# Patient Record
Sex: FEMALE | Race: WHITE | NOT HISPANIC OR LATINO | Employment: FULL TIME | RURAL
[De-identification: names, ages, dates, MRNs, and addresses within clinical notes are randomized per-mention and may not be internally consistent; named-entity substitution may affect disease eponyms.]

---

## 2021-11-23 ENCOUNTER — LAB VISIT (OUTPATIENT)
Dept: PRIMARY CARE CLINIC | Facility: CLINIC | Age: 26
End: 2021-11-23

## 2021-11-23 DIAGNOSIS — Z02.83 ENCOUNTER FOR DRUG SCREENING: Primary | ICD-10-CM

## 2021-11-23 PROCEDURE — 99000 SPECIMEN HANDLING OFFICE-LAB: CPT | Mod: ,,, | Performed by: NURSE PRACTITIONER

## 2021-11-23 PROCEDURE — 99000 PR SPECIMEN HANDLING,DR OFF->LAB: ICD-10-PCS | Mod: ,,, | Performed by: NURSE PRACTITIONER

## 2024-11-11 ENCOUNTER — OFFICE VISIT (OUTPATIENT)
Dept: FAMILY MEDICINE | Facility: CLINIC | Age: 29
End: 2024-11-11
Payer: COMMERCIAL

## 2024-11-11 VITALS
RESPIRATION RATE: 16 BRPM | OXYGEN SATURATION: 98 % | WEIGHT: 134 LBS | HEIGHT: 62 IN | DIASTOLIC BLOOD PRESSURE: 85 MMHG | TEMPERATURE: 98 F | BODY MASS INDEX: 24.66 KG/M2 | HEART RATE: 106 BPM | SYSTOLIC BLOOD PRESSURE: 118 MMHG

## 2024-11-11 DIAGNOSIS — M25.562 PAIN AND SWELLING OF KNEE, LEFT: Primary | ICD-10-CM

## 2024-11-11 DIAGNOSIS — S89.92XA INJURY OF LEFT KNEE, INITIAL ENCOUNTER: ICD-10-CM

## 2024-11-11 DIAGNOSIS — M25.562 ACUTE PAIN OF LEFT KNEE: ICD-10-CM

## 2024-11-11 DIAGNOSIS — M25.462 PAIN AND SWELLING OF KNEE, LEFT: Primary | ICD-10-CM

## 2024-11-11 LAB
BASOPHILS # BLD AUTO: 0.08 K/UL (ref 0–0.2)
BASOPHILS NFR BLD AUTO: 1.2 % (ref 0–1)
CRP SERPL-MCNC: <0.29 MG/DL (ref 0–0.8)
DIFFERENTIAL METHOD BLD: ABNORMAL
EOSINOPHIL # BLD AUTO: 0.44 K/UL (ref 0–0.5)
EOSINOPHIL NFR BLD AUTO: 6.8 % (ref 1–4)
ERYTHROCYTE [DISTWIDTH] IN BLOOD BY AUTOMATED COUNT: 13.5 % (ref 11.5–14.5)
ERYTHROCYTE [SEDIMENTATION RATE] IN BLOOD BY WESTERGREN METHOD: 2 MM/HR (ref 0–20)
FOLATE SERPL-MCNC: 9.6 NG/ML (ref 3.1–17.5)
HCT VFR BLD AUTO: 44.7 % (ref 38–47)
HGB BLD-MCNC: 14.7 G/DL (ref 12–16)
IMM GRANULOCYTES # BLD AUTO: 0.01 K/UL (ref 0–0.04)
IMM GRANULOCYTES NFR BLD: 0.2 % (ref 0–0.4)
LYMPHOCYTES # BLD AUTO: 0.9 K/UL (ref 1–4.8)
LYMPHOCYTES NFR BLD AUTO: 14 % (ref 27–41)
MCH RBC QN AUTO: 27.8 PG (ref 27–31)
MCHC RBC AUTO-ENTMCNC: 32.9 G/DL (ref 32–36)
MCV RBC AUTO: 84.5 FL (ref 80–96)
MONOCYTES # BLD AUTO: 0.44 K/UL (ref 0–0.8)
MONOCYTES NFR BLD AUTO: 6.8 % (ref 2–6)
MPC BLD CALC-MCNC: 11.4 FL (ref 9.4–12.4)
NEUTROPHILS # BLD AUTO: 4.56 K/UL (ref 1.8–7.7)
NEUTROPHILS NFR BLD AUTO: 71 % (ref 53–65)
NRBC # BLD AUTO: 0 X10E3/UL
NRBC, AUTO (.00): 0 %
PLATELET # BLD AUTO: 230 K/UL (ref 150–400)
RBC # BLD AUTO: 5.29 M/UL (ref 4.2–5.4)
TSH SERPL DL<=0.005 MIU/L-ACNC: 3.06 UIU/ML (ref 0.36–3.74)
VIT B12 SERPL-MCNC: 425 PG/ML (ref 193–986)
WBC # BLD AUTO: 6.43 K/UL (ref 4.5–11)

## 2024-11-11 PROCEDURE — 86140 C-REACTIVE PROTEIN: CPT | Mod: ,,, | Performed by: CLINICAL MEDICAL LABORATORY

## 2024-11-11 PROCEDURE — 1160F RVW MEDS BY RX/DR IN RCRD: CPT | Mod: CPTII,,, | Performed by: FAMILY MEDICINE

## 2024-11-11 PROCEDURE — 1159F MED LIST DOCD IN RCRD: CPT | Mod: CPTII,,, | Performed by: FAMILY MEDICINE

## 2024-11-11 PROCEDURE — 82746 ASSAY OF FOLIC ACID SERUM: CPT | Mod: ,,, | Performed by: CLINICAL MEDICAL LABORATORY

## 2024-11-11 PROCEDURE — 3008F BODY MASS INDEX DOCD: CPT | Mod: CPTII,,, | Performed by: FAMILY MEDICINE

## 2024-11-11 PROCEDURE — 3074F SYST BP LT 130 MM HG: CPT | Mod: CPTII,,, | Performed by: FAMILY MEDICINE

## 2024-11-11 PROCEDURE — 99203 OFFICE O/P NEW LOW 30 MIN: CPT | Mod: GE,,, | Performed by: FAMILY MEDICINE

## 2024-11-11 PROCEDURE — 85651 RBC SED RATE NONAUTOMATED: CPT | Mod: ,,, | Performed by: CLINICAL MEDICAL LABORATORY

## 2024-11-11 PROCEDURE — 3079F DIAST BP 80-89 MM HG: CPT | Mod: CPTII,,, | Performed by: FAMILY MEDICINE

## 2024-11-11 PROCEDURE — 85025 COMPLETE CBC W/AUTO DIFF WBC: CPT | Mod: ,,, | Performed by: CLINICAL MEDICAL LABORATORY

## 2024-11-11 PROCEDURE — 82607 VITAMIN B-12: CPT | Mod: ,,, | Performed by: CLINICAL MEDICAL LABORATORY

## 2024-11-11 PROCEDURE — 84443 ASSAY THYROID STIM HORMONE: CPT | Mod: ,,, | Performed by: CLINICAL MEDICAL LABORATORY

## 2024-11-11 RX ORDER — MELOXICAM 7.5 MG/1
7.5 TABLET ORAL DAILY
Qty: 30 TABLET | Refills: 0 | Status: SHIPPED | OUTPATIENT
Start: 2024-11-11 | End: 2024-12-11

## 2024-11-11 NOTE — PROGRESS NOTES
"  Fabiola Burch MD MPH  [address], [place] [address]  Phone: [phone]     Subjective     Name: James Kirk   YOB: 1995 (29 y.o.)  MRN: [id number]  Visit Date: 11/11/2024   Chief Complaint: Establish Care and Pain (Knee, left , h/o recurrent "knee dislocation, treated with PT, last episode was 2019, no local ortho, this episode 10 days ago sitting in bathroom and knee popped)        HISTORY OF PRESENT ILLNESS:  Patient is a 28 yo female with no significant PMH. She came to the clinic for the complaint of left sided knee pain and lower extremity weakness/numbness/tingling. She reported that on Halloween james, she was getting up from the commode when she heard a "popping sound" from her left knee. She experienced a sharp pain in her knee that traveled to her calf and foot. She was not able to get out of commode in anticipation of pain fear of falling.  She has been using crutches since the episode.  Patient denied fever, chills, loss of bladder/rectal control.  Patient has had at least 3 episodes of knee joint dislocation from 2015 till 2024,  LMP was 4 months ago and she is on Inject ible contraception.         PAST MEDICAL HISTORY:  Significant Diagnoses - Patient  has no past medical history on file.  Medications - Patient is not on any long-term medications.   Allergies - Patient has No Known Allergies.  Surgeries - Patient  has no past surgical history on file.  Family History - Patient family history is not on file.      SOCIAL HISTORY:  Tobacco - Patient  reports that she has never smoked. She has never used smokeless tobacco.   Alcohol - Patient  reports that she does not currently use alcohol.   Recreational Drugs - Patient  reports no history of drug use.       Review of Systems   Constitutional:  Negative for activity change, appetite change, chills, fatigue and fever.   HENT:  Negative for nasal congestion, ear discharge, ear pain, hearing loss, postnasal drip, rhinorrhea, " "sinus pressure/congestion and sore throat.    Eyes:  Negative for discharge, itching and visual disturbance.   Respiratory:  Negative for cough, choking, chest tightness, shortness of breath and wheezing.    Cardiovascular:  Negative for chest pain, palpitations, leg swelling and claudication.   Gastrointestinal:  Negative for abdominal distention, abdominal pain, constipation, diarrhea, nausea, vomiting and reflux.   Genitourinary:  Negative for difficulty urinating, dysuria, frequency, hematuria and urgency.   Musculoskeletal:  Positive for joint swelling and leg pain. Negative for arthralgias and myalgias.   Integumentary:  Negative for rash.   Neurological:  Positive for weakness. Negative for tremors, light-headedness, numbness and headaches.          History reviewed. No pertinent past medical history.     Review of patient's allergies indicates:  No Known Allergies     History reviewed. No pertinent surgical history.     History reviewed. No pertinent family history.    Current Outpatient Medications   Medication Instructions    meloxicam (MOBIC) 7.5 mg, Oral, Daily        Objective     /85 (BP Location: Left arm, Patient Position: Sitting)   Pulse 106   Temp 98.1 °F (36.7 °C) (Oral)   Resp 16   Ht 5' 2" (1.575 m)   Wt 60.8 kg (134 lb)   LMP  (Exact Date)   SpO2 98%   BMI 24.51 kg/m²     Physical Exam  Vitals and nursing note reviewed.   Constitutional:       Appearance: Normal appearance. She is normal weight.   HENT:      Head: Normocephalic and atraumatic.      Right Ear: Tympanic membrane and ear canal normal.      Left Ear: Tympanic membrane and ear canal normal.      Nose: Nose normal.      Mouth/Throat:      Mouth: Mucous membranes are moist.      Pharynx: Oropharynx is clear.   Eyes:      Extraocular Movements: Extraocular movements intact.      Conjunctiva/sclera: Conjunctivae normal.      Pupils: Pupils are equal, round, and reactive to light.   Cardiovascular:      Rate and Rhythm: " "Normal rate and regular rhythm.      Pulses: Normal pulses.      Heart sounds: Normal heart sounds. No murmur heard.  Pulmonary:      Effort: Pulmonary effort is normal. No respiratory distress.      Breath sounds: Normal breath sounds. No wheezing.   Abdominal:      General: Bowel sounds are normal. There is no distension.      Palpations: Abdomen is soft.      Tenderness: There is no abdominal tenderness. There is no guarding.   Musculoskeletal:         General: Swelling and tenderness present.      Cervical back: Normal range of motion and neck supple.      Right knee: Normal.      Left knee: Swelling present. Decreased range of motion. Tenderness present. No LCL laxity, MCL laxity, ACL laxity or PCL laxity.Normal meniscus and normal patellar mobility.   Skin:     Capillary Refill: Capillary refill takes less than 2 seconds.   Neurological:      General: No focal deficit present.      Mental Status: She is alert and oriented to person, place, and time. Mental status is at baseline.      Deep Tendon Reflexes: Reflexes are normal and symmetric. Reflexes normal.   Psychiatric:         Mood and Affect: Mood normal.         Behavior: Behavior normal.         Thought Content: Thought content normal.         Judgment: Judgment normal.          All recently obtained labs have been reviewed and discussed in detail with the patient.   Assessment     1. Pain and swelling of knee, left    2. Acute pain of left knee    3. Injury of left knee, initial encounter         Plan     Problem List Items Addressed This Visit       Pain and swelling of knee, left - Primary     She came to the clinic for the complaint of left sided knee pain and lower extremity weakness/numbness/tingling. She reported that on Halloween james, she was getting up from the commode when she heard a "popping sound" from her left knee. She experienced a sharp pain in her knee that traveled to her calf and foot. She was not able to get out of commode in " "anticipation of pain fear of falling.  She has been using crutches since the episode.  - Xray 3 view of left knee  - Mobic 7.5 mg  - Ambulatory referral to orthopedic surgery  - Ambulatory referral to physical therapy  - follow up in 10 days         Relevant Medications    meloxicam (MOBIC) 7.5 MG tablet    Other Relevant Orders    CBC Auto Differential    Vitamin B12 & Folate    TSH    C-Reactive Protein    Sedimentation Rate    Ambulatory referral/consult to Orthopedics    Ambulatory referral/consult to Physical/Occupational Therapy    X-Ray Knee 1 or 2 View Left (Completed)    Injury of left knee     She came to the clinic for the complaint of left sided knee pain and lower extremity weakness/numbness/tingling. She reported that on Halloween james, she was getting up from the commode when she heard a "popping sound" from her left knee. She experienced a sharp pain in her knee that traveled to her calf and foot. She was not able to get out of commode in anticipation of pain fear of falling.  She has been using crutches since the episode.  - Xray 3 view of left knee  - Mobic 7.5 mg  - Ambulatory referral to orthopedic surgery  - Ambulatory referral to physical therapy  - follow up in 10 days         Relevant Medications    meloxicam (MOBIC) 7.5 MG tablet    Other Relevant Orders    CBC Auto Differential    Vitamin B12 & Folate    TSH    C-Reactive Protein    Sedimentation Rate    Ambulatory referral/consult to Orthopedics    Ambulatory referral/consult to Physical/Occupational Therapy    X-Ray Knee 1 or 2 View Left (Completed)         All orders:  Orders Placed This Encounter    X-Ray Knee 1 or 2 View Left    CBC Auto Differential    Vitamin B12 & Folate    TSH    C-Reactive Protein    Sedimentation Rate    CBC with Differential    Ambulatory referral/consult to Orthopedics    Ambulatory referral/consult to Physical/Occupational Therapy    meloxicam (MOBIC) 7.5 MG tablet          Follow up in about 1 week (around " 11/18/2024).    Fabiola Burch MD MPH

## 2024-11-11 NOTE — ASSESSMENT & PLAN NOTE
"She came to the clinic for the complaint of left sided knee pain and lower extremity weakness/numbness/tingling. She reported that on Halloween james, she was getting up from the commode when she heard a "popping sound" from her left knee. She experienced a sharp pain in her knee that traveled to her calf and foot. She was not able to get out of commode in anticipation of pain fear of falling.  She has been using crutches since the episode.  - Xray 3 view of left knee  - Mobic 7.5 mg  - Ambulatory referral to orthopedic surgery  - Ambulatory referral to physical therapy  - follow up in 10 days  "

## 2024-11-14 ENCOUNTER — OFFICE VISIT (OUTPATIENT)
Dept: ORTHOPEDICS | Facility: CLINIC | Age: 29
End: 2024-11-14
Payer: COMMERCIAL

## 2024-11-14 VITALS — WEIGHT: 134 LBS | HEIGHT: 62 IN | BODY MASS INDEX: 24.66 KG/M2

## 2024-11-14 DIAGNOSIS — M25.562 ACUTE PAIN OF LEFT KNEE: ICD-10-CM

## 2024-11-14 DIAGNOSIS — M25.462 PAIN AND SWELLING OF KNEE, LEFT: ICD-10-CM

## 2024-11-14 DIAGNOSIS — M25.562 PAIN AND SWELLING OF KNEE, LEFT: ICD-10-CM

## 2024-11-14 DIAGNOSIS — S89.92XA INJURY OF LEFT KNEE, INITIAL ENCOUNTER: ICD-10-CM

## 2024-11-14 PROCEDURE — 99203 OFFICE O/P NEW LOW 30 MIN: CPT | Mod: S$PBB,,,

## 2024-11-14 PROCEDURE — 99213 OFFICE O/P EST LOW 20 MIN: CPT | Mod: PBBFAC

## 2024-11-14 PROCEDURE — 3008F BODY MASS INDEX DOCD: CPT | Mod: CPTII,,,

## 2024-11-14 PROCEDURE — 99999 PR PBB SHADOW E&M-EST. PATIENT-LVL III: CPT | Mod: PBBFAC,,,

## 2024-11-19 ENCOUNTER — CLINICAL SUPPORT (OUTPATIENT)
Dept: REHABILITATION | Facility: HOSPITAL | Age: 29
End: 2024-11-19
Payer: COMMERCIAL

## 2024-11-19 DIAGNOSIS — M25.462 PAIN AND SWELLING OF KNEE, LEFT: ICD-10-CM

## 2024-11-19 DIAGNOSIS — R26.9 GAIT DIFFICULTY: ICD-10-CM

## 2024-11-19 DIAGNOSIS — S89.92XA INJURY OF LEFT KNEE, INITIAL ENCOUNTER: ICD-10-CM

## 2024-11-19 DIAGNOSIS — M62.81 WEAKNESS OF LEFT QUADRICEPS MUSCLE: ICD-10-CM

## 2024-11-19 DIAGNOSIS — M25.662 DECREASED ROM OF LEFT KNEE: ICD-10-CM

## 2024-11-19 DIAGNOSIS — M25.562 PAIN AND SWELLING OF KNEE, LEFT: ICD-10-CM

## 2024-11-19 DIAGNOSIS — M25.562 ACUTE PAIN OF LEFT KNEE: Primary | ICD-10-CM

## 2024-11-19 PROCEDURE — 97162 PT EVAL MOD COMPLEX 30 MIN: CPT

## 2024-11-19 PROCEDURE — 97110 THERAPEUTIC EXERCISES: CPT

## 2024-11-19 PROCEDURE — 97112 NEUROMUSCULAR REEDUCATION: CPT

## 2024-11-19 PROCEDURE — 97116 GAIT TRAINING THERAPY: CPT

## 2024-11-19 NOTE — PLAN OF CARE
[facility]   Physical Therapy Initial Evaluation      Name: James Kirk  Clinic Number: [id number]    Therapy Diagnosis:   Encounter Diagnoses   Name Primary?    Acute pain of left knee     Pain and swelling of knee, left     Injury of left knee, initial encounter         Physician: Shalini Hunt MD    Physician Orders: PT Eval and Treat   Medical Diagnosis from Referral: see above  Evaluation Date: 11/19/2024  Authorization Period Expiration: 11/19/2024  Plan of Care Expiration: 2/14/2025    Date of Surgery: n/a  Visit # / Visits authorized: 1/1 - evaluation only    FOTO: 1/ 3    Precautions: Standard     Time In: 1:06 pm  Time Out: 2:38 pm  Total Billable Time: 92 minutes    Subjective     Date of onset: 10/31/2024    History of current condition - James reports: knee popped when she went to sit down on the commode - said it was a loud crunching sound - said she was to scared to stand back up on her own - has been using crutches or a wheel chair ever since - says she is scared her knee cap is going to dislocate because it has done that a couple of times in the past    Falls: n/a    Imaging: xray - normal; MRI scheduled for 12/9/24    Prior Therapy: none  Social History:  lives with their family  Occupation:   Prior Level of Function: independent   Current Level of Function: has been sleeping in recliner, independent with dressing and toileting    Pain:  Current 0/10, worst 8/10, best 0/10   Location: left knee    Description: Aching, Burning, Sharp, Shooting, and Variable  Aggravating Factors: Standing, Bending, Walking, Extension, and Getting out of bed/chair  Easing Factors: rest and sitting in recliner    Patients goals: be able to walk without crutches and without pain in knee     Medical History:   No past medical history on file.    Surgical History:   James Kirk  has no past surgical history on file.    Medications:   James has a current medication  list which includes the following prescription(s): meloxicam.    Allergies:   Review of patient's allergies indicates:  No Known Allergies     Objective        Range of motion:  Motion Right Left    Knee extension  WITHIN FUNCTIONAL LIMITS   -38 degrees    Knee flexion  WITHIN FUNCTIONAL LIMITS   52 degrees        Manual muscle test   Muscle Right  Left    Knee extension  MMT strength: 4/5  MMT strength: 3/5   Knee flexion  MMT strength: 4/5  MMT strength: 3/5       Gait:  Weight bearing precautions: no weight bearing precautions from MD but patient has not been putting any weight on it since the end of October  Assistive device: in a wheelchair today - uses crutches some  Ambulation distance and deviations:  Stairs: steps to enter home - has to have assistance of 2 people to assist up and down  Comments:      Clinical Special Tests:    Knee  Anterior drawer:  left Negative  Posterior drawer:  left Negative  Varus stress test:  left Negative  Valgus stress test:  left Negative  PFJ grind test:  left Negative  McMurrays:  left Negative      Comments: poor quad set - patient very guarded and was co-shahla severely preventing knee from moving - has not been putting weight on left lower extremity - arrived in wheelchair - pain complaints were in calf/hamstring/quad/hip flexors not the actual knee joint     Intake Outcome Measure for FOTO Knee Survey    Therapist reviewed FOTO scores for James Kirk on 11/19/2024.   FOTO report - see Media section or FOTO account episode details.    Intake Score: 31       Clinical Information for Insurance Authorization     Dates of Services: 11/19/2024 to 02/14/2025  Discharge Plan: Patient will be discharged from skilled physical therapy treatment once all goals have been met, patient has plateaued, or physician/insurance requests discontinuation of care. Patient will be discharged with a home exercise program.   Type of therapy: Rehabilitative  ICD-10 Diagnosis Code(s):    M25.562 (ICD-10-CM) - Acute pain of left knee   M25.562,M25.462 (ICD-10-CM) - Pain and swelling of knee, left   S89.92XA (ICD-10-CM) - Injury of left knee, initial encounter     Which side is symptomatic? Left  Surgical: No  Surgical procedure: N/A  Surgery date: N/A.  Presenting symptoms/diagnoses are repetitive in nature.  Presenting symptoms are non-radiating in nature.   The rehabilitation is not related to a diagnosis of cancer.  The rehabilitation is not related to a diagnosis of lymphedema.  Patient's clinical presentation is:  Severe objective and functional deficits: consistent intense symptoms with severe loss of range of motion, strength, or ability to perform daily tasks  CPT Codes Requested:  68733 [therapeutic exercise], 42733 [neuromuscular re-education], 17504 [gait training], 67855 [manual therapy], 71527 [therapeutic activities], 25976 [aquatic therapy], 60345 [unattended electrical stimulation], 96516 [biofeedback by any modality], and 25309 [vasopneumatic device]     Treatment     Total Treatment time (time-based codes) separate from Evaluation: 42 minutes     James received the treatments listed below:      Passive range of motion to help patient gain full extension in supine, verbal and tactile cueing for quad sets - able to initiate but unable to sustain, weight shifting in walker, gait training with rolling walker - finally started putting about 50% weight through the left lower extremity, gait training up and down stairs w/ bilateral handrails - cues to go up with right lower extremity first and go down with left lower extremity first, kinesiotaping to left lower extremity for neuromuscular reeducation of left quad, education on home exercise program     Gait x 15 min  Neuromuscular reeducation x 15 min  Therex x 12 min    Patient Education and Home Exercises     Education provided:   - evaluation results, plan of care and home exercise program     Written Home Exercises Provided: yes.  "Exercises were reviewed and James was able to demonstrate them prior to the end of the session.  James demonstrated good  understanding of the education provided. See EMR under Patient Instructions for exercises provided during therapy sessions.    Assessment     James is a 29 y.o. female referred to outpatient Physical Therapy with a medical diagnosis of left knee pain. Patient arrived in a wheelchair with complaints of left knee pain that started on Halloween. She says the knee had been feeling "off" that day and she laid down for a little while. Then she says she got up to go to the bathroom and as she went to sit down on the commode her knee "popped and crunched" and scared her as she says her kneecap has dislocated a few times. She states she was too scared to attempt to stand up by herself and began using crutches until she went to a primary care doctor on 11/11/24. She had an xray that was normal. She was referred to ortho and went on 11/14/24. An MRI has been ordered but is not scheduled until 12/9. She states that she was told at both places to not put any weight on the leg until she had her MRI but this is not found in either of their notes. She was extremely guarded and apprehensive today. At the beginning of the evaluation she would not really move the knee. She was co-shahla severely and only had about 14 degrees of range of motion to start. Spent a lot of time trying to get patient to relax and convince her that her knee cap was not going to pop out of place if she moved her knee. After gentle passive range of motion was finally able to get knee to full extension. All of her pain complaints with special tests and range of motion were in the thigh musculature and not the knee joint itself. She had poor quad set. Kinesiotape was placed on left knee with half figure 8 on each side of patella to give quad feedback. By the end of the evaluation she was able to walk with rolling walker with CGA/SBA " and was able to go up/down steps with step to pattern and bilateral handrails.     Patient prognosis is Good.   Patient will benefit from skilled outpatient Physical Therapy to address the deficits stated above and in the chart below, provide patient /family education, and to maximize patientt's level of independence.     Plan of care discussed with patient: Yes  Patient's spiritual, cultural and educational needs considered and patient is agreeable to the plan of care and goals as stated below:     Anticipated Barriers for therapy: severe apprehension about knee cap dislocating    Medical Necessity is demonstrated by the following  History  Co-morbidities and personal factors that may impact the plan of care [] LOW: no personal factors / co-morbidities  [x] MODERATE: 1-2 personal factors / co-morbidities  [] HIGH: 3+ personal factors / co-morbidities    Moderate / High Support Documentation:   Co-morbidities affecting plan of care: h/o patellar dislocation    Personal Factors:   no deficits     Examination  Body Structures and Functions, activity limitations and participation restrictions that may impact the plan of care [] LOW: addressing 1-2 elements  [x] MODERATE: 3+ elements  [] HIGH: 4+ elements (please support below)    Moderate / High Support Documentation: pain, decreased range of motion, gait difficulty, severe apprehension causing self limitation, decreased ability to perform activiites of daily living      Clinical Presentation [] LOW: stable  [x] MODERATE: Evolving  [] HIGH: Unstable     Decision Making/ Complexity Score: moderate         SHORT TERM GOALS  1.  Patient to be independent with home exercise program to facilitate carryover between therapy visits.  2.  Patient will have 0-120 degrees range of motion left knee for improved mobility.  3.  Patient will perform straight leg raise and hold 10 seconds without quad lag for increased quad control.  4.  Patient will increase manual muscle test of  left lower extremity to 4+ to 5/5 for increased stability with gait and activities of daily living.    LONG TERM GOALS  1.  Patient will go up/down stairs reciprocal pattern with handrails as needed and good eccentric control on left lower extremity.  2.  Patient will ambulate independent without assistive device, without deviation and without pain in left knee.  3.  Patient will return to all normal daily activities.     Plan     Plan of care Certification: 11/19/2024 to 2/14/2025.    Outpatient Physical Therapy 2 times weekly for 12 weeks to include the following interventions: 67549 [therapeutic exercise], 27931 [neuromuscular re-education], 32026 [gait training], 03666 [manual therapy], 65359 [therapeutic activities], 66025 [aquatic therapy], 87842 [unattended electrical stimulation], 44756 [biofeedback by any modality], and 47733 [vasopneumatic device].     TED PIPER, PT        Physician's Signature: _________________________________________ Date: ________________

## 2024-11-20 NOTE — PROGRESS NOTES
CC:   Chief Complaint   Patient presents with    Knee Pain     Patient is being seen for left knee pain. Patient stated that she popped her knee on halloween and it has been in pain since. Patient reports having muscle spasms.Denies having any injuries or falls.      HPI     Knee Pain     Additional comments: Patient is being seen for left knee pain. Patient   stated that she popped her knee on halloween and it has been in pain   since. Patient reports having muscle spasms.Denies having any injuries or   falls.          Last edited by Porsha Ruiz CMA on 11/14/2024  3:04 PM.        James Kirk is a 29 y.o. female seen today for Knee Pain (Patient is being seen for left knee pain. Patient stated that she popped her knee on halloween and it has been in pain since. Patient reports having muscle spasms.Denies having any injuries or falls.)  Patient presents today with complaints of chronic left knee pain however the night of Halloween she reports she was was walking and felt a pop in her knee.  It buckled out from under her.  She reports having muscle spasms since that injury.  She has difficulty weight-bearing due to pain.  She also reports some swelling in her knee.  She was [facility] on [facility]/11/2024 where x-rays showed no acute fracture or dislocation, she was referred to Orthopedics.  Already taking Mobic without much relief.  No other complaints today.        PAST MEDICAL HISTORY: No past medical history on file.       PAST SURGICAL HISTORY: No past surgical history on file.       ALLERGIES: Review of patient's allergies indicates:  No Known Allergies     MEDICATIONS :    Current Outpatient Medications:     meloxicam (MOBIC) 7.5 MG tablet, Take 1 tablet (7.5 mg total) by mouth once daily., Disp: 30 tablet, Rfl: 0     SOCIAL HISTORY:   Social History     Socioeconomic History    Marital status: Single   Tobacco Use    Smoking status: Never    Smokeless tobacco: Never    Substance and Sexual Activity    Alcohol use: Not Currently    Drug use: Never    Sexual activity: Yes        FAMILY HISTORY: No family history on file.       PHYSICAL EXAM:      There were no vitals filed for this visit.  Body mass index is 24.51 kg/m².    GENERAL: Well-developed, well-nourished female . The patient is alert, oriented and cooperative.    HEENT:  Normocephalic, atraumatic.  Extraocular movements are intact bilaterally.     NECK:  Nontender with good range of motion.    LUNGS:  Clear to auscultation bilaterally.    HEART:  Regular rate and rhythm.     ABDOMEN:  Soft, non-tender, non-distended.      EXTREMITIES:  Left knee was skin clean dry and intact, mild soft tissue swelling, tenderness to palpation along medial joint line, range of motion from 0-100 degrees but not without pain, knee feels stable to varus and valgus stress testing, positive Breonna's testing, neurovascularly intact      RADIOGRAPHIC FINDINGS:   X-Ray Knee 1 or 2 View Left    Result Date: 11/11/2024  EXAMINATION: XR KNEE 1 OR 2 VIEW LEFT CLINICAL HISTORY: Pain in left knee TECHNIQUE: AP and lateral views of the left knee. COMPARISON: None FINDINGS: No acute fracture or dislocation.  No significant soft tissue swelling. The joint spaces are preserved. No significant suprapatellar effusion.     No acute radiographic findings of the left knee. Electronically signed by: Trev Awan Date:    11/11/2024 Time:    12:34       Patient Active Problem List    Diagnosis Date Noted    Pain and swelling of knee, left 11/11/2024    Injury of left knee 11/11/2024     IMPRESSION AND PLAN:  Acute left knee pain.  Personally reviewed previous x-rays showing mild degenerative changes, no acute fracture or dislocation.  Discussed all treatment options with the patient today.  Given acute injury we will order MRI of the left knee.  We will call her with the MRI results.  Discussed continuation of ice and elevation therapy.  Continue Tylenol as  needed for pain in addition to Mobic.    No follow-ups on file.       Starr Becerra PA-C      (Subject to voice recognition error, transcription service not allowed)

## 2024-11-21 ENCOUNTER — OFFICE VISIT (OUTPATIENT)
Dept: FAMILY MEDICINE | Facility: CLINIC | Age: 29
End: 2024-11-21
Payer: COMMERCIAL

## 2024-11-21 VITALS
DIASTOLIC BLOOD PRESSURE: 77 MMHG | RESPIRATION RATE: 18 BRPM | HEART RATE: 79 BPM | WEIGHT: 134 LBS | HEIGHT: 62 IN | OXYGEN SATURATION: 98 % | SYSTOLIC BLOOD PRESSURE: 114 MMHG | BODY MASS INDEX: 24.66 KG/M2 | TEMPERATURE: 98 F

## 2024-11-21 DIAGNOSIS — M25.562 PAIN AND SWELLING OF KNEE, LEFT: Primary | ICD-10-CM

## 2024-11-21 DIAGNOSIS — M25.462 PAIN AND SWELLING OF KNEE, LEFT: Primary | ICD-10-CM

## 2024-11-21 PROCEDURE — 3078F DIAST BP <80 MM HG: CPT | Mod: CPTII,,, | Performed by: FAMILY MEDICINE

## 2024-11-21 PROCEDURE — 1160F RVW MEDS BY RX/DR IN RCRD: CPT | Mod: CPTII,,, | Performed by: FAMILY MEDICINE

## 2024-11-21 PROCEDURE — 1159F MED LIST DOCD IN RCRD: CPT | Mod: CPTII,,, | Performed by: FAMILY MEDICINE

## 2024-11-21 PROCEDURE — 3008F BODY MASS INDEX DOCD: CPT | Mod: CPTII,,, | Performed by: FAMILY MEDICINE

## 2024-11-21 PROCEDURE — 99212 OFFICE O/P EST SF 10 MIN: CPT | Mod: GE,,, | Performed by: FAMILY MEDICINE

## 2024-11-21 PROCEDURE — 3074F SYST BP LT 130 MM HG: CPT | Mod: CPTII,,, | Performed by: FAMILY MEDICINE

## 2024-11-21 RX ORDER — CYCLOBENZAPRINE HCL 10 MG
10 TABLET ORAL NIGHTLY PRN
Qty: 21 TABLET | Refills: 0 | Status: SHIPPED | OUTPATIENT
Start: 2024-11-21 | End: 2024-11-21

## 2024-11-21 NOTE — PROGRESS NOTES
Subjective:       Patient ID: James Kirk is a 29 y.o. female.    Chief Complaint: Pain (X1 week follow up for pain and swelling in her knee, started PT Tuesday. )    28 yo F with a PMH of left knee injury and chronic left knee pain [facility] pain followup. Patient saw Dr. Burch on 11/11/24 for her left knee pain. Patient had an injury with left knee popping around Halloween time and has been having pain and muscle spasms since. She said the left knee went buckle out from under her when she was sitting on the toilet and trying to stand up. Denies f/c/cp/sob/n/v/d. Denies appetite, urinary, or bowel habit changes. XR left knee on 11/11/24 showed no acute findings without fracture or dislocation. Patient was referred to Ortho, PT (started and said it helped), and was treated with Mobic with some relief. However, patient is still having some left knee pain and needs to use crutches to help. On the day of clinic visit patient was sitting in a wheelchair.          Current Outpatient Medications:     meloxicam (MOBIC) 7.5 MG tablet, Take 1 tablet (7.5 mg total) by mouth once daily., Disp: 30 tablet, Rfl: 0    Review of patient's allergies indicates:  No Known Allergies    History reviewed. No pertinent past medical history.    History reviewed. No pertinent surgical history.    History reviewed. No pertinent family history.    Social History     Tobacco Use    Smoking status: Never    Smokeless tobacco: Never   Substance Use Topics    Alcohol use: Not Currently    Drug use: Never       Review of Systems   Constitutional:  Negative for chills, diaphoresis, fatigue and fever.   HENT:  Negative for trouble swallowing.    Eyes:  Negative for visual disturbance.   Respiratory:  Negative for cough and shortness of breath.    Cardiovascular:  Negative for chest pain.   Gastrointestinal:  Negative for abdominal pain, nausea and vomiting.   Endocrine: Negative for polyuria.   Genitourinary:   "Negative for bladder incontinence, difficulty urinating and dysuria.   Musculoskeletal:  Positive for arthralgias. Negative for joint deformity.   Integumentary:  Negative for wound.   Neurological:  Negative for syncope and headaches.   Psychiatric/Behavioral:  Negative for sleep disturbance.          Current Medications:   Medication List with Changes/Refills   Current Medications    MELOXICAM (MOBIC) 7.5 MG TABLET    Take 1 tablet (7.5 mg total) by mouth once daily.       Start Date: 11/11/2024End Date: 12/11/2024            Objective:        Vitals:    11/21/24 1050   BP: 114/77   BP Location: Right arm   Patient Position: Sitting   Pulse: 79   Resp: 18   Temp: 98 °F (36.7 °C)   TempSrc: Oral   SpO2: 98%   Weight: 60.8 kg (134 lb)   Height: 5' 2" (1.575 m)       Physical Exam  Vitals and nursing note reviewed.   Constitutional:       General: She is not in acute distress.     Appearance: Normal appearance. She is not ill-appearing, toxic-appearing or diaphoretic.   HENT:      Head: Normocephalic and atraumatic.      Nose: Nose normal.      Mouth/Throat:      Mouth: Mucous membranes are moist.      Pharynx: Oropharynx is clear. No oropharyngeal exudate or posterior oropharyngeal erythema.   Eyes:      General: No scleral icterus.        Right eye: No discharge.         Left eye: No discharge.      Pupils: Pupils are equal, round, and reactive to light.   Cardiovascular:      Rate and Rhythm: Normal rate and regular rhythm.      Pulses: Normal pulses.      Heart sounds: Normal heart sounds. No murmur heard.  Pulmonary:      Effort: Pulmonary effort is normal. No respiratory distress.      Breath sounds: Normal breath sounds. No wheezing.   Abdominal:      General: Abdomen is flat. There is no distension.   Musculoskeletal:         General: Tenderness present. No swelling or deformity.      Cervical back: Neck supple.      Right lower leg: No edema.      Left lower leg: No edema.   Skin:     General: Skin is warm " and dry.      Coloration: Skin is not jaundiced.      Findings: No lesion.   Neurological:      Mental Status: She is alert.      Sensory: No sensory deficit.   Psychiatric:         Mood and Affect: Mood normal.         Behavior: Behavior normal.               Lab Results   Component Value Date    WBC 6.43 11/11/2024    HGB 14.7 11/11/2024    HCT 44.7 11/11/2024     11/11/2024    TSH 3.060 11/11/2024      Assessment:       1. Pain and swelling of knee, left        Plan:         Problem List Items Addressed This Visit          Orthopedic    Pain and swelling of knee, left - Primary     Monitor  left knee pain followup. Patient saw Dr. Burch on 11/11/24 for her left knee pain. Patient had an injury with left knee popping around Halloween time and has been having pain and muscle spasms since. She said the left knee went buckle out from under her when she was sitting on the toilet and trying to stand up. Denies f/c/cp/sob/n/v/d. Denies appetite, urinary, or bowel habit changes. XR left knee on 11/11/24 showed no acute findings without fracture or dislocation. Patient was referred to Ortho, PT (started and said it helped), and was treated with Mobic with some relief. However, patient is still having some left knee pain and needs to use crutches to help. On the day of clinic visit patient was sitting in a wheelchair.    Evaluate  On wheelchair during exam, mild tenderness to left knee joint. Otherwise unremarkable exam - see PE  XR left knee  (11/11/24) showed mild degenerative changes but no acute findings, no fracture or dislocation, no ST swelling, no suprapatellar effusion  All labs from 11/11/24 were unremarkable, CRP < 0.29, ESR 2 wnl, B12 425, folate 9.6 wnl, CBC wnl, WBC 6.43, TSH 3.06 wnl.    Assess  Left knee pain    Treat  MRI left knee scheduled on 12/9/24  OTC Tylenol added by ortho in addition to patient's Mobic 7.5 qd  Trial of OTC Mg supplement for left knee muscle spasm (none appreciated on exam),  consider flexeril at f/u if no relief.  Continue ice, elevation, PT outpatient  F/u 1 month              Follow up in about 1 month (around 12/21/2024) for left knee pain.    Arsenio Mtz DO     Instructed patient that if symptoms fail to improve or worsen patient should seek immediate medical attention or report to the nearest emergency department. Patient expressed verbal agreement and understanding to this plan of care.

## 2024-11-25 PROBLEM — M25.562 ACUTE PAIN OF LEFT KNEE: Status: ACTIVE | Noted: 2024-11-25

## 2024-11-25 PROBLEM — R26.9 GAIT DIFFICULTY: Status: ACTIVE | Noted: 2024-11-25

## 2024-11-25 PROBLEM — M62.81 WEAKNESS OF LEFT QUADRICEPS MUSCLE: Status: ACTIVE | Noted: 2024-11-25

## 2024-11-25 PROBLEM — M25.662 DECREASED ROM OF LEFT KNEE: Status: ACTIVE | Noted: 2024-11-25

## 2024-11-25 NOTE — ASSESSMENT & PLAN NOTE
Monitor  left knee pain followup. Patient saw Dr. Burch on 11/11/24 for her left knee pain. Patient had an injury with left knee popping around Halloween time and has been having pain and muscle spasms since. She said the left knee went buckle out from under her when she was sitting on the toilet and trying to stand up. Denies f/c/cp/sob/n/v/d. Denies appetite, urinary, or bowel habit changes. XR left knee on 11/11/24 showed no acute findings without fracture or dislocation. Patient was referred to Ortho, PT (started and said it helped), and was treated with Mobic with some relief. However, patient is still having some left knee pain and needs to use crutches to help. On the day of clinic visit patient was sitting in a wheelchair.    Evaluate  On wheelchair during exam, mild tenderness to left knee joint. Otherwise unremarkable exam - see PE  XR left knee  (11/11/24) showed mild degenerative changes but no acute findings, no fracture or dislocation, no ST swelling, no suprapatellar effusion  All labs from 11/11/24 were unremarkable, CRP < 0.29, ESR 2 wnl, B12 425, folate 9.6 wnl, CBC wnl, WBC 6.43, TSH 3.06 wnl.    Assess  Left knee pain    Treat  MRI left knee scheduled on 12/9/24  OTC Tylenol added by ortho in addition to patient's Mobic 7.5 qd  Trial of OTC Mg supplement for left knee muscle spasm (none appreciated on exam), consider flexeril at f/u if no relief.  Continue ice, elevation, PT outpatient  F/u 1 month

## 2024-11-26 ENCOUNTER — CLINICAL SUPPORT (OUTPATIENT)
Dept: REHABILITATION | Facility: HOSPITAL | Age: 29
End: 2024-11-26
Payer: COMMERCIAL

## 2024-11-26 DIAGNOSIS — M25.562 ACUTE PAIN OF LEFT KNEE: Primary | ICD-10-CM

## 2024-11-26 DIAGNOSIS — M25.662 DECREASED ROM OF LEFT KNEE: ICD-10-CM

## 2024-11-26 DIAGNOSIS — R26.9 GAIT DIFFICULTY: ICD-10-CM

## 2024-11-26 DIAGNOSIS — M62.81 WEAKNESS OF LEFT QUADRICEPS MUSCLE: ICD-10-CM

## 2024-11-26 PROCEDURE — 97116 GAIT TRAINING THERAPY: CPT

## 2024-11-26 PROCEDURE — 97110 THERAPEUTIC EXERCISES: CPT

## 2024-11-26 PROCEDURE — 97014 ELECTRIC STIMULATION THERAPY: CPT

## 2024-11-26 PROCEDURE — 97112 NEUROMUSCULAR REEDUCATION: CPT

## 2024-11-26 NOTE — PROGRESS NOTES
[facility]   Physical Therapy Treatment Note      Name: James Kirk  Clinic Number: [id number]    Therapy Diagnosis:   Encounter Diagnoses   Name Primary?    Acute pain of left knee Yes    Decreased ROM of left knee     Gait difficulty     Weakness of left quadriceps muscle      Physician: Shalini Hunt MD    Visit Date: 11/26/2024    Physician Orders: PT Eval and Treat   Medical Diagnosis from Referral: see above  Evaluation Date: 11/19/2024  Authorization Period Expiration: 2/17/2025  Plan of Care Expiration: 2/14/2025     Date of Surgery: n/a  Visit # / Visits authorized: 2/13     FOTO: 1/3 = 31     Precautions: Standard     PTA Visit #: 0/5     Time In: 11:40 am  Time Out: 12:20 pm  Total Billable Time: 40 minutes    Subjective     Pt reports: arrived in wheel chair but says she walked in with her crutches from the parking lot but then got a chair from the admissions desk - says she has been doing her ex's.  She was compliant with home exercise program.  Response to previous treatment: no complaints   Functional change: not using w/c inside home    Pain: 2/10  Location: left knee/thigh    Objective      2-112 degrees range of motion left knee    Treatment     James received the treatments listed below:      therapeutic exercises to develop strength, endurance, ROM, flexibility, posture, and core stabilization for 12 minutes including:  Hamstring rolls w/ peanut x 20  Bridge on peanut x 20  Kinesiotape removal    manual therapy techniques: Joint mobilizations, Manual traction, Myofacial release, Soft tissue Mobilization, and Friction Massage were applied for 0 minutes, including:  -    neuromuscular re-education activities to improve: Balance, Coordination, Kinesthetic Sense, Proprioception, and Posture for 23 minutes. The following activities were included:  Short arc quad x 20   Straight leg raise 3 x 5 w/ min/mod assist  Quad set x 8 min with NMES and both verbal and  "tactile cueing  Closed chain terminal knee extension - ball on wall - 5sh x 20    therapeutic activities to improve functional performance for 0  minutes, including:  -    gait training to improve functional mobility and safety for 5  minutes, including:  Gait training with bilateral axillary crutches with cueing to improve heel strike and toe off - has a tendency to hit flat footed and no rocking from heel to toe - she also requires cueing to put weight on the left lower extremity as when she changes directions she keeps weight off that leg    direct contact modalities after being cleared for contraindications:     supervised modalities after being cleared for contradictions: NMES Electrical Stimulation:  James received NMES Electrical Stimulation to elicit muscle contraction of the left quad. Pt received stimulation at a rate of 50 pps with symmetric current, ramp of 2 seconds with 10 second on time and 20 second off time for  8 minutes. Patient tolerated treatment well without any adverse effects.     biofeedback to isolate quad contraction, decrease cocontraction, and assist with neuromuscular reeducation for 0 minutes. Exercises performed with biofeedback Including: -      Patient Education and Home Exercises       Education provided:   - review of home exercise program and current Plan of Care/rationale of treatment.    Written Home Exercises Provided: Patient instructed to cont prior HEP. Exercises were reviewed and James was able to demonstrate them prior to the end of the session.  James demonstrated good understanding of the education provided. See EMR under Patient Instructions for exercises provided during therapy sessions    Assessment     At Evaluation:  James is a 29 y.o. female referred to outpatient Physical Therapy with a medical diagnosis of left knee pain. Patient arrived in a wheelchair with complaints of left knee pain that started on Halloween. She says the knee had been feeling "off" that " "day and she laid down for a little while. Then she says she got up to go to the bathroom and as she went to sit down on the commode her knee "popped and crunched" and scared her as she says her kneecap has dislocated a few times. She states she was too scared to attempt to stand up by herself and began using crutches until she went to a primary care doctor on 11/11/24. She had an xray that was normal. She was referred to ortho and went on 11/14/24. An MRI has been ordered but is not scheduled until 12/9. She states that she was told at both places to not put any weight on the leg until she had her MRI but this is not found in either of their notes. She was extremely guarded and apprehensive today. At the beginning of the evaluation she would not really move the knee. She was co-shahla severely and only had about 14 degrees of range of motion to start. Spent a lot of time trying to get patient to relax and convince her that her knee cap was not going to pop out of place if she moved her knee. After gentle passive range of motion was finally able to get knee to full extension. All of her pain complaints with special tests and range of motion were in the thigh musculature and not the knee joint itself. She had poor quad set. Kinesiotape was placed on left knee with half figure 8 on each side of patella to give quad feedback. By the end of the evaluation she was able to walk with rolling walker with CGA/SBA and was able to go up/down steps with step to pattern and bilateral handrails.        Current Assessment:  James arrived for her first visit following evaluation. She arrived in a wheelchair again but states she walked from the parking lot to the front lobby with her crutches then her boyfriend got her a chair from the admissions desk to bring her back to the clinic. She did state that she has not been using her wheelchair inside her home. She had much improved range of motion today compared to evaluation. " Kinesiotape was still on so this was removed to give her skin time to breathe. It was not reapplied today. She has very poor volitional quad contraction. Used Swiss NMES but she could not tolerate it high enough to elicit a good contraction of the quad. She co-contracts hamstrings and glutes and is very apprehensive about shahla her quad although she has difficulty doing a volitional quad set on the right lower extremity as well. Uncertain at this point if she has some actual internal derangement or if it is more psychosomatic. Will continue to work on quad strength and control, range of motion, gait, and pain control. Continue current plan of care and progress as tolerated.    James Is progressing towards her goals.   Pt prognosis is Good.     Pt will continue to benefit from skilled outpatient physical therapy to address the deficits listed in the problem list box on initial evaluation, provide pt/family education and to maximize pt's level of independence in the home and community environment.     Pt's spiritual, cultural and educational needs considered and pt agreeable to plan of care and goals.     Anticipated barriers to physical therapy: none    SHORT TERM GOALS  1.  Patient to be independent with home exercise program to facilitate carryover between therapy visits.  2.  Patient will have 0-120 degrees range of motion left knee for improved mobility.  3.  Patient will perform straight leg raise and hold 10 seconds without quad lag for increased quad control.  4.  Patient will increase manual muscle test of left lower extremity to 4+ to 5/5 for increased stability with gait and activities of daily living.     LONG TERM GOALS  1.  Patient will go up/down stairs reciprocal pattern with handrails as needed and good eccentric control on left lower extremity.  2.  Patient will ambulate independent without assistive device, without deviation and without pain in left knee.  3.  Patient will return to all  normal daily activities.     Plan     Plan of care Certification: 11/19/2024 to 2/14/2025.     Outpatient Physical Therapy 2 times weekly for 12 weeks to include the following interventions: 72779 [therapeutic exercise], 77458 [neuromuscular re-education], 29675 [gait training], 64000 [manual therapy], 90493 [therapeutic activities], 39656 [aquatic therapy], 18800 [unattended electrical stimulation], 93742 [biofeedback by any modality], and 62139 [vasopneumatic device].     TED PIPER, PT   11/26/2024

## 2024-12-03 ENCOUNTER — CLINICAL SUPPORT (OUTPATIENT)
Dept: REHABILITATION | Facility: HOSPITAL | Age: 29
End: 2024-12-03
Payer: COMMERCIAL

## 2024-12-03 DIAGNOSIS — M25.562 ACUTE PAIN OF LEFT KNEE: Primary | ICD-10-CM

## 2024-12-03 DIAGNOSIS — M25.662 DECREASED ROM OF LEFT KNEE: ICD-10-CM

## 2024-12-03 DIAGNOSIS — M62.81 WEAKNESS OF LEFT QUADRICEPS MUSCLE: ICD-10-CM

## 2024-12-03 DIAGNOSIS — R26.9 GAIT DIFFICULTY: ICD-10-CM

## 2024-12-03 PROCEDURE — 97014 ELECTRIC STIMULATION THERAPY: CPT | Mod: CQ

## 2024-12-03 PROCEDURE — 97112 NEUROMUSCULAR REEDUCATION: CPT | Mod: CQ

## 2024-12-03 PROCEDURE — 97110 THERAPEUTIC EXERCISES: CPT | Mod: CQ

## 2024-12-03 NOTE — PROGRESS NOTES
[facility]   Physical Therapy Treatment Note      Name: James Kirk  Clinic Number: [id number]    Therapy Diagnosis:   Encounter Diagnoses   Name Primary?    Acute pain of left knee Yes    Decreased ROM of left knee     Gait difficulty     Weakness of left quadriceps muscle      Physician: Shalini Hunt MD    Visit Date: 12/3/2024    Physician Orders: PT Eval and Treat   Medical Diagnosis from Referral: see above  Evaluation Date: 11/19/2024  Authorization Period Expiration: 2/17/2025  Plan of Care Expiration: 2/14/2025     Date of Surgery: n/a  Visit # / Visits authorized: 3/13     FOTO: 1/3 = 31     Precautions: Standard     PTA Visit #: 1/5     Time In: 1:00 pm  Time Out: 1:55 pm  Total Billable Time: 55 minutes    Subjective     Pt reports: she arrived on crutches today with partial weightbearing. Stated she went to the store with her parents yesterday and used the crutches the whole time.  Reports a pain in posterior knee after walking so much.  She was compliant with home exercise program.  Response to previous treatment: no complaints   Functional change: not using w/c inside home    Pain: 2/10  Location: left knee/thigh    Objective      8-110 degrees range of motion left knee on arrival; 1 degree resting extension after neuromuscular electrical stimulation and moist heat   Case conference with Fay Licona PT, for initial PTA visit.     Treatment     James received the treatments listed below:      therapeutic exercises to develop strength, endurance, ROM, flexibility, posture, and core stabilization for 10 minutes including:  Bike x 5 minutes   Hamstring rolls w/ peanut x 3 minutes   Bridge on peanut x 20  Slant board for calf stretch     manual therapy techniques: Joint mobilizations, Manual traction, Myofacial release, Soft tissue Mobilization, and Friction Massage were applied for 0 minutes, including:  -    neuromuscular re-education activities to improve:  Balance, Coordination, Kinesthetic Sense, Proprioception, and Posture for 35 minutes. The following activities were included:  Quad sets x 15 with verbal cues  Propped extension with moist heat x 4 minutes   Straight leg raise   Quad set x 5 min with NMES and both verbal and tactile cueing  Straight leg raise x 5 minutes with neuromuscular electrical stimulation with strap for assist  Short arc quad x 5 minutes with neuromuscular electrical stimulation and strap  Closed chain terminal knee extension - ball on wall - 5sh     therapeutic activities to improve functional performance for 0  minutes, including:  Bilateral leg press with cues for full extension 3 plates x 20 (4 plates was too heavy)    gait training to improve functional mobility and safety for 5  minutes, including:  Gait training with bilateral axillary crutches with cueing to improve heel strike and toe off - has a tendency to hit flat footed and no rocking from heel to toe - she also requires cueing to put weight on the left lower extremity as when she changes directions she keeps weight off that leg    direct contact modalities after being cleared for contraindications:     supervised modalities after being cleared for contradictions: NMES Electrical Stimulation:  James received NMES Electrical Stimulation to elicit muscle contraction of the left quad. Pt received stimulation at a rate of 50 pps with symmetric current, ramp of 2 seconds with 10 second on time and 20 second off time for  15 minutes. Patient tolerated treatment well without any adverse effects.     biofeedback to isolate quad contraction, decrease cocontraction, and assist with neuromuscular reeducation for 0 minutes. Exercises performed with biofeedback Including: -      Patient Education and Home Exercises       Education provided:   - review of home exercise program and current Plan of Care/rationale of treatment.    Written Home Exercises Provided: Patient instructed to cont prior  "HEP. Exercises were reviewed and James was able to demonstrate them prior to the end of the session.  James demonstrated good understanding of the education provided. See EMR under Patient Instructions for exercises provided during therapy sessions    Assessment     At Evaluation:  James is a 29 y.o. female referred to outpatient Physical Therapy with a medical diagnosis of left knee pain. Patient arrived in a wheelchair with complaints of left knee pain that started on Halloween. She says the knee had been feeling "off" that day and she laid down for a little while. Then she says she got up to go to the bathroom and as she went to sit down on the commode her knee "popped and crunched" and scared her as she says her kneecap has dislocated a few times. She states she was too scared to attempt to stand up by herself and began using crutches until she went to a primary care doctor on 11/11/24. She had an xray that was normal. She was referred to ortho and went on 11/14/24. An MRI has been ordered but is not scheduled until 12/9. She states that she was told at both places to not put any weight on the leg until she had her MRI but this is not found in either of their notes. She was extremely guarded and apprehensive today. At the beginning of the evaluation she would not really move the knee. She was co-shahla severely and only had about 14 degrees of range of motion to start. Spent a lot of time trying to get patient to relax and convince her that her knee cap was not going to pop out of place if she moved her knee. After gentle passive range of motion was finally able to get knee to full extension. All of her pain complaints with special tests and range of motion were in the thigh musculature and not the knee joint itself. She had poor quad set. Kinesiotape was placed on left knee with half figure 8 on each side of patella to give quad feedback. By the end of the evaluation she was able to walk with rolling " walker with CGA/SBA and was able to go up/down steps with step to pattern and bilateral handrails.        Current Assessment:  James arrived for her second visit following evaluation.  She ambulated into clinic with bilateral crutches and partial weightbearing with foot flat on initial contact. She did better with extending her knee but still had trouble getting a clean volitional quad contraction. Used Russian NMES and gave her a strap to assist with straight leg raise and short arc quad.  She was able to perform leg press but weight had to be very light and she did not get full extension despite her best efforts. Ended with bike with slow but full revolutions. Brief gait training again with focus on terminal knee extension at heelstrike. Will continue to work on quad strength and control, range of motion, gait, and pain control. Continue current plan of care and progress as tolerated.    James Is progressing towards her goals.   Pt prognosis is Good.     Pt will continue to benefit from skilled outpatient physical therapy to address the deficits listed in the problem list box on initial evaluation, provide pt/family education and to maximize pt's level of independence in the home and community environment.     Pt's spiritual, cultural and educational needs considered and pt agreeable to plan of care and goals.     Anticipated barriers to physical therapy: none    SHORT TERM GOALS  1.  Patient to be independent with home exercise program to facilitate carryover between therapy visits.  2.  Patient will have 0-120 degrees range of motion left knee for improved mobility.  3.  Patient will perform straight leg raise and hold 10 seconds without quad lag for increased quad control.  4.  Patient will increase manual muscle test of left lower extremity to 4+ to 5/5 for increased stability with gait and activities of daily living.     LONG TERM GOALS  1.  Patient will go up/down stairs reciprocal pattern with handrails  as needed and good eccentric control on left lower extremity.  2.  Patient will ambulate independent without assistive device, without deviation and without pain in left knee.  3.  Patient will return to all normal daily activities.     Plan     Plan of care Certification: 11/19/2024 to 2/14/2025.     Outpatient Physical Therapy 2 times weekly for 12 weeks to include the following interventions: 71590 [therapeutic exercise], 05680 [neuromuscular re-education], 11998 [gait training], 16071 [manual therapy], 94994 [therapeutic activities], 93392 [aquatic therapy], 16930 [unattended electrical stimulation], 07548 [biofeedback by any modality], and 65413 [vasopneumatic device].     Leda Jordan, PTA   12/03/2024

## 2024-12-05 ENCOUNTER — CLINICAL SUPPORT (OUTPATIENT)
Dept: REHABILITATION | Facility: HOSPITAL | Age: 29
End: 2024-12-05
Payer: COMMERCIAL

## 2024-12-05 DIAGNOSIS — M62.81 WEAKNESS OF LEFT QUADRICEPS MUSCLE: ICD-10-CM

## 2024-12-05 DIAGNOSIS — R26.9 GAIT DIFFICULTY: ICD-10-CM

## 2024-12-05 DIAGNOSIS — M25.662 DECREASED ROM OF LEFT KNEE: ICD-10-CM

## 2024-12-05 DIAGNOSIS — M25.562 ACUTE PAIN OF LEFT KNEE: Primary | ICD-10-CM

## 2024-12-05 PROCEDURE — 97530 THERAPEUTIC ACTIVITIES: CPT | Mod: CQ

## 2024-12-05 PROCEDURE — 97112 NEUROMUSCULAR REEDUCATION: CPT | Mod: CQ

## 2024-12-05 PROCEDURE — 97110 THERAPEUTIC EXERCISES: CPT | Mod: CQ

## 2024-12-05 PROCEDURE — 97014 ELECTRIC STIMULATION THERAPY: CPT | Mod: CQ

## 2024-12-05 NOTE — PROGRESS NOTES
[facility]   Physical Therapy Treatment Note      Name: James Kirk  Clinic Number: [id number]    Therapy Diagnosis:   Encounter Diagnoses   Name Primary?    Acute pain of left knee Yes    Decreased ROM of left knee     Gait difficulty     Weakness of left quadriceps muscle      Physician: Shalini Hunt MD    Visit Date: 12/5/2024    Physician Orders: PT Eval and Treat   Medical Diagnosis from Referral: see above  Evaluation Date: 11/19/2024  Authorization Period Expiration: 2/17/2025  Plan of Care Expiration: 2/14/2025     Date of Surgery: n/a  Visit # / Visits authorized: 4/13     FOTO: 1/3 = 31     Precautions: Standard     PTA Visit #: 2/5     Time In: 1:45 pm  Time Out: 2:42 pm  Total Billable Time: 55 minutes    Subjective     Pt reports: she arrived on crutches again. Her hip/thighs have been popping. She still hasn't been able to get into the bath.  She was compliant with home exercise program.  Response to previous treatment: no complaints   Functional change: not using w/c inside home    Pain: 2/10  Location: left knee/thigh    Objective      2-122 degrees range of motion left knee on dismissal     Treatment     James received the treatments listed below:      therapeutic exercises to develop strength, endurance, ROM, flexibility, posture, and core stabilization for 10 minutes including:  Bike x 5 minutes   Seated hamstring stretch 5 x 20 second hold   Hamstring rolls w/ peanut    Bridge on peanut   Slant board for calf stretch 10 x 10 second hold     manual therapy techniques: Joint mobilizations, Manual traction, Myofacial release, Soft tissue Mobilization, and Friction Massage were applied for 0 minutes, including:  -    neuromuscular re-education activities to improve: Balance, Coordination, Kinesthetic Sense, Proprioception, and Posture for 12 minutes. The following activities were included:  Quad sets with verbal cues  Propped extension with moist heat    Straight leg raise   Quad set x 4 min with NMES and both verbal and tactile cueing  Straight leg raise x 4 minutes with neuromuscular electrical stimulation with strap for assist  Short arc quad x 4 minutes with neuromuscular electrical stimulation and strap  Closed chain terminal knee extension - ball on wall - 5sh     therapeutic activities to improve functional performance for  33 minutes, including:  Bilateral leg press with cues for full extension 3 plates x 20   Unilateral leg press 1 plate 10 x 10 second hold   Sit to stands at rail x 20  Calf raises x 20 at rail  Marching at rail x 20    gait training to improve functional mobility and safety for 0  minutes, including:  Gait training with bilateral axillary crutches with cueing to improve heel strike and toe off - has a tendency to hit flat footed and no rocking from heel to toe - she also requires cueing to put weight on the left lower extremity as when she changes directions she keeps weight off that leg    direct contact modalities after being cleared for contraindications:     supervised modalities after being cleared for contradictions: NMES Electrical Stimulation:  James received NMES Electrical Stimulation to elicit muscle contraction of the left quad. Pt received stimulation at a rate of 50 pps with symmetric current, ramp of 2 seconds with 10 second on time and 20 second off time for  12 minutes. Patient tolerated treatment well without any adverse effects.     biofeedback to isolate quad contraction, decrease cocontraction, and assist with neuromuscular reeducation for 0 minutes. Exercises performed with biofeedback Including: -      Patient Education and Home Exercises       Education provided:   - review of home exercise program and current Plan of Care/rationale of treatment.    Written Home Exercises Provided: Patient instructed to cont prior HEP. Exercises were reviewed and James was able to demonstrate them prior to the end of the  "session.  James demonstrated good understanding of the education provided. See EMR under Patient Instructions for exercises provided during therapy sessions    Assessment     At Evaluation:  James is a 29 y.o. female referred to outpatient Physical Therapy with a medical diagnosis of left knee pain. Patient arrived in a wheelchair with complaints of left knee pain that started on Halloween. She says the knee had been feeling "off" that day and she laid down for a little while. Then she says she got up to go to the bathroom and as she went to sit down on the commode her knee "popped and crunched" and scared her as she says her kneecap has dislocated a few times. She states she was too scared to attempt to stand up by herself and began using crutches until she went to a primary care doctor on 11/11/24. She had an xray that was normal. She was referred to ortho and went on 11/14/24. An MRI has been ordered but is not scheduled until 12/9. She states that she was told at both places to not put any weight on the leg until she had her MRI but this is not found in either of their notes. She was extremely guarded and apprehensive today. At the beginning of the evaluation she would not really move the knee. She was co-shahla severely and only had about 14 degrees of range of motion to start. Spent a lot of time trying to get patient to relax and convince her that her knee cap was not going to pop out of place if she moved her knee. After gentle passive range of motion was finally able to get knee to full extension. All of her pain complaints with special tests and range of motion were in the thigh musculature and not the knee joint itself. She had poor quad set. Kinesiotape was placed on left knee with half figure 8 on each side of patella to give quad feedback. By the end of the evaluation she was able to walk with rolling walker with CGA/SBA and was able to go up/down steps with step to pattern and bilateral " handrails.        Current Assessment:  James arrived for her third visit following evaluation.  She continues to ambulate with bilateral crutches with slow, apprehensive gait. She lacks heelstrike and terminal knee extension despite cues - ambulates through stance phase with about 20-30 degrees flexion. Focused on weightbearing today. She did a lot of exercises standing and transferring weight, including calf raises, sit to stands, slant board stretch, unilateral leg press, and marching at rail. Range of motion has improved. Will continue to work on quad strength and control, range of motion, gait, and pain control. Continue current plan of care and progress as tolerated.    James Is progressing towards her goals.   Pt prognosis is Good.     Pt will continue to benefit from skilled outpatient physical therapy to address the deficits listed in the problem list box on initial evaluation, provide pt/family education and to maximize pt's level of independence in the home and community environment.     Pt's spiritual, cultural and educational needs considered and pt agreeable to plan of care and goals.     Anticipated barriers to physical therapy: none    SHORT TERM GOALS  1.  Patient to be independent with home exercise program to facilitate carryover between therapy visits.  2.  Patient will have 0-120 degrees range of motion left knee for improved mobility.  3.  Patient will perform straight leg raise and hold 10 seconds without quad lag for increased quad control.  4.  Patient will increase manual muscle test of left lower extremity to 4+ to 5/5 for increased stability with gait and activities of daily living.     LONG TERM GOALS  1.  Patient will go up/down stairs reciprocal pattern with handrails as needed and good eccentric control on left lower extremity.  2.  Patient will ambulate independent without assistive device, without deviation and without pain in left knee.  3.  Patient will return to all normal daily  activities.     Plan     Plan of care Certification: 11/19/2024 to 2/14/2025.     Outpatient Physical Therapy 2 times weekly for 12 weeks to include the following interventions: 79400 [therapeutic exercise], 02098 [neuromuscular re-education], 23969 [gait training], 92754 [manual therapy], 37333 [therapeutic activities], 85143 [aquatic therapy], 65537 [unattended electrical stimulation], 59173 [biofeedback by any modality], and 01193 [vasopneumatic device].     Leda Jordan, PTA   12/05/2024

## 2024-12-09 ENCOUNTER — HOSPITAL ENCOUNTER (OUTPATIENT)
Dept: RADIOLOGY | Facility: HOSPITAL | Age: 29
Discharge: HOME | End: 2024-12-09
Payer: COMMERCIAL

## 2024-12-09 DIAGNOSIS — M25.562 ACUTE PAIN OF LEFT KNEE: ICD-10-CM

## 2024-12-09 PROCEDURE — 73721 MRI JNT OF LWR EXTRE W/O DYE: CPT | Mod: 26,LT,, | Performed by: RADIOLOGY

## 2024-12-09 PROCEDURE — 73721 MRI JNT OF LWR EXTRE W/O DYE: CPT | Mod: TC,LT

## 2024-12-10 ENCOUNTER — CLINICAL SUPPORT (OUTPATIENT)
Dept: REHABILITATION | Facility: HOSPITAL | Age: 29
End: 2024-12-10
Payer: COMMERCIAL

## 2024-12-10 DIAGNOSIS — M25.562 ACUTE PAIN OF LEFT KNEE: Primary | ICD-10-CM

## 2024-12-10 DIAGNOSIS — M25.662 DECREASED ROM OF LEFT KNEE: ICD-10-CM

## 2024-12-10 DIAGNOSIS — R26.9 GAIT DIFFICULTY: ICD-10-CM

## 2024-12-10 DIAGNOSIS — M62.81 WEAKNESS OF LEFT QUADRICEPS MUSCLE: ICD-10-CM

## 2024-12-10 PROCEDURE — 97530 THERAPEUTIC ACTIVITIES: CPT | Mod: CQ

## 2024-12-10 PROCEDURE — 97112 NEUROMUSCULAR REEDUCATION: CPT | Mod: CQ

## 2024-12-10 PROCEDURE — 97014 ELECTRIC STIMULATION THERAPY: CPT | Mod: CQ

## 2024-12-10 NOTE — PROGRESS NOTES
[facility]   Physical Therapy Treatment Note      Name: James Kirk  Clinic Number: [id number]    Therapy Diagnosis:   Encounter Diagnoses   Name Primary?    Acute pain of left knee Yes    Decreased ROM of left knee     Gait difficulty     Weakness of left quadriceps muscle      Physician: Shalini Hunt MD    Visit Date: 12/10/2024    Physician Orders: PT Eval and Treat   Medical Diagnosis from Referral: see above  Evaluation Date: 11/19/2024  Authorization Period Expiration: 2/17/2025  Plan of Care Expiration: 2/14/2025     Date of Surgery: n/a  Visit # / Visits authorized: 5/13     FOTO: 1/3 = 31     Precautions: Standard     PTA Visit #: 3/5     Time In: 1:00 pm  Time Out: 2:08 pm  Total Billable Time: 53 minutes    Subjective     Pt reports: she is putting more weight through her legs because her arms are sore.  She was compliant with home exercise program.  Response to previous treatment: no complaints   Functional change: not using w/c inside home    Pain: 2/10  Location: left knee/thigh    Objective      0-130 degrees range of motion left knee on dismissal     Treatment     James received the treatments listed below:      therapeutic exercises to develop strength, endurance, ROM, flexibility, posture, and core stabilization for 7 minutes including:  Bike x 5 minutes   Seated hamstring stretch   Slant board for calf stretch 3 x 30 second hold     manual therapy techniques: Joint mobilizations, Manual traction, Myofacial release, Soft tissue Mobilization, and Friction Massage were applied for 0 minutes, including:  -    neuromuscular re-education activities to improve: Balance, Coordination, Kinesthetic Sense, Proprioception, and Posture for 20 minutes. The following activities were included:  Quad set x 5 min with NMES and both verbal and tactile cueing  Straight leg raise x 5 minutes with neuromuscular electrical stimulation with strap for assist  Short arc quad x 5  minutes with neuromuscular electrical stimulation and strap  Closed chain terminal knee extension - ball on wall - 5sh   Standing on bilateral feet without holding on to anything 3 x 30 second hold   Wobble board bilateral holding onto rails 5 x 20 seconds       therapeutic activities to improve functional performance for  23 minutes, including:  Bilateral leg press with cues for full extension 3 plates x 30   Unilateral leg press 1 plate 10 x 10 second hold   Sit to stands at rail x 20 (one hand on rail)  Calf raises x 20 at rail  Marching at rail x 20  Standing hip abduction 2 x 10 each leg     gait training to improve functional mobility and safety for 0  minutes, including:  Gait training with bilateral axillary crutches with cueing to improve heel strike and toe off - has a tendency to hit flat footed and no rocking from heel to toe - she also requires cueing to put weight on the left lower extremity as when she changes directions she keeps weight off that leg    direct contact modalities after being cleared for contraindications:     supervised modalities after being cleared for contradictions: NMES Electrical Stimulation:  James received NMES Electrical Stimulation to elicit muscle contraction of the left quad. Pt received stimulation at a rate of 50 pps with symmetric current, ramp of 2 seconds with 10 second on time and 20 second off time for  15 minutes. Patient tolerated treatment well without any adverse effects.     biofeedback to isolate quad contraction, decrease cocontraction, and assist with neuromuscular reeducation for 0 minutes. Exercises performed with biofeedback Including: -      Patient Education and Home Exercises       Education provided:   - review of home exercise program and current Plan of Care/rationale of treatment.    Written Home Exercises Provided: Patient instructed to cont prior HEP. Exercises were reviewed and James was able to demonstrate them prior to the end of the session.  " James demonstrated good understanding of the education provided. See EMR under Patient Instructions for exercises provided during therapy sessions    Assessment     At Evaluation:  James is a 29 y.o. female referred to outpatient Physical Therapy with a medical diagnosis of left knee pain. Patient arrived in a wheelchair with complaints of left knee pain that started on Halloween. She says the knee had been feeling "off" that day and she laid down for a little while. Then she says she got up to go to the bathroom and as she went to sit down on the commode her knee "popped and crunched" and scared her as she says her kneecap has dislocated a few times. She states she was too scared to attempt to stand up by herself and began using crutches until she went to a primary care doctor on 11/11/24. She had an xray that was normal. She was referred to ortho and went on 11/14/24. An MRI has been ordered but is not scheduled until 12/9. She states that she was told at both places to not put any weight on the leg until she had her MRI but this is not found in either of their notes. She was extremely guarded and apprehensive today. At the beginning of the evaluation she would not really move the knee. She was co-shahla severely and only had about 14 degrees of range of motion to start. Spent a lot of time trying to get patient to relax and convince her that her knee cap was not going to pop out of place if she moved her knee. After gentle passive range of motion was finally able to get knee to full extension. All of her pain complaints with special tests and range of motion were in the thigh musculature and not the knee joint itself. She had poor quad set. Kinesiotape was placed on left knee with half figure 8 on each side of patella to give quad feedback. By the end of the evaluation she was able to walk with rolling walker with CGA/SBA and was able to go up/down steps with step to pattern and bilateral handrails.    "     Current Assessment:  James had her MRI yesterday but the results have not yet been released.  She arrived on bilateral crutches but ambulating with more weight on legs today and more confident gait. She was tight while initiating revolutions on bike but quickly loosened up and was able to go all the way around. She was resting at 0 degrees extension today and had much better control of straight leg raise - able to lift leg with less effort and no assistance with 10 degree quad lag.  She began working on balance with weight equally distributed between feet while holding on to rail. She was able to stand on firm surface without holding on with weight on bilateral feet and to stand on wobble board and equalized weight distribution while holding on to rail with both hands. Will continue to work on quad strength and control, range of motion, gait, and pain control. Continue current plan of care and progress as tolerated.    James Is progressing towards her goals.   Pt prognosis is Good.     Pt will continue to benefit from skilled outpatient physical therapy to address the deficits listed in the problem list box on initial evaluation, provide pt/family education and to maximize pt's level of independence in the home and community environment.     Pt's spiritual, cultural and educational needs considered and pt agreeable to plan of care and goals.     Anticipated barriers to physical therapy: none    SHORT TERM GOALS  1.  Patient to be independent with home exercise program to facilitate carryover between therapy visits.  2.  Patient will have 0-120 degrees range of motion left knee for improved mobility.  3.  Patient will perform straight leg raise and hold 10 seconds without quad lag for increased quad control.  4.  Patient will increase manual muscle test of left lower extremity to 4+ to 5/5 for increased stability with gait and activities of daily living.     LONG TERM GOALS  1.  Patient will go up/down stairs  reciprocal pattern with handrails as needed and good eccentric control on left lower extremity.  2.  Patient will ambulate independent without assistive device, without deviation and without pain in left knee.  3.  Patient will return to all normal daily activities.     Plan     Plan of care Certification: 11/19/2024 to 2/14/2025.     Outpatient Physical Therapy 2 times weekly for 12 weeks to include the following interventions: 91922 [therapeutic exercise], 71147 [neuromuscular re-education], 77932 [gait training], 68334 [manual therapy], 47544 [therapeutic activities], 76759 [aquatic therapy], 40511 [unattended electrical stimulation], 55820 [biofeedback by any modality], and 30919 [vasopneumatic device].     Leda Jordan, PTA   12/10/2024

## 2024-12-12 ENCOUNTER — CLINICAL SUPPORT (OUTPATIENT)
Dept: REHABILITATION | Facility: HOSPITAL | Age: 29
End: 2024-12-12
Payer: COMMERCIAL

## 2024-12-12 ENCOUNTER — TELEPHONE (OUTPATIENT)
Dept: ORTHOPEDICS | Facility: CLINIC | Age: 29
End: 2024-12-12
Payer: COMMERCIAL

## 2024-12-12 DIAGNOSIS — M25.662 DECREASED ROM OF LEFT KNEE: ICD-10-CM

## 2024-12-12 DIAGNOSIS — M62.81 WEAKNESS OF LEFT QUADRICEPS MUSCLE: ICD-10-CM

## 2024-12-12 DIAGNOSIS — R26.9 GAIT DIFFICULTY: ICD-10-CM

## 2024-12-12 DIAGNOSIS — M25.562 ACUTE PAIN OF LEFT KNEE: Primary | ICD-10-CM

## 2024-12-12 PROCEDURE — 97112 NEUROMUSCULAR REEDUCATION: CPT

## 2024-12-12 PROCEDURE — 97014 ELECTRIC STIMULATION THERAPY: CPT

## 2024-12-12 PROCEDURE — 97530 THERAPEUTIC ACTIVITIES: CPT

## 2024-12-12 NOTE — TELEPHONE ENCOUNTER
Returned phone call and let patient know that Bonnie Becerra is out until Monday and that I will forward this message to her so she can look more into it on Monday. Patient understood and verbalized understanding.    Thank you,  Porsha Ruiz CMA

## 2024-12-12 NOTE — TELEPHONE ENCOUNTER
Spoken with patient and she would like to come and received a left knee injection. Patient is scheduled to see MINNIE Hurtado 12/17/2024 at 10:00 Am. Patient understood and verbalized understanding.      Thank you,  Porsha Ruiz CMA

## 2024-12-12 NOTE — TELEPHONE ENCOUNTER
----- Message from MINNIE Kim sent at 12/12/2024 10:55 AM CST -----  MRI results with lateral patella tilt and patellofemoral chondromalacia.  Patient is already participating physical therapy.  Caught please call patient with results and see if she would like to come in for a steroid shot of her left knee.

## 2024-12-12 NOTE — PROGRESS NOTES
[facility]   Physical Therapy Treatment Note      Name: James Kirk  Clinic Number: [id number]    Therapy Diagnosis:   Encounter Diagnoses   Name Primary?    Acute pain of left knee Yes    Decreased ROM of left knee     Gait difficulty     Weakness of left quadriceps muscle      Physician: Shalini Hunt MD    Visit Date: 12/12/2024    Physician Orders: PT Eval and Treat   Medical Diagnosis from Referral: see above  Evaluation Date: 11/19/2024  Authorization Period Expiration: 2/17/2025  Plan of Care Expiration: 2/14/2025     Date of Surgery: n/a  Visit # / Visits authorized: 6/13     FOTO: 1/3 = 31     Precautions: Standard     PTA Visit #: 0/5     Time In: 1:50 pm  Time Out: 2:48 pm  Total Billable Time: 45 minutes (13 nonbillable)    Subjective     Pt reports: she is putting more weight through her legs because her arms are sore.  She was compliant with home exercise program.  Response to previous treatment: no complaints   Functional change: not using w/c inside home    Pain: 2/10  Location: left knee/thigh    Objective      0-130 degrees range of motion left knee on dismissal     Treatment     James received the treatments listed below:      therapeutic exercises to develop strength, endurance, ROM, flexibility, posture, and core stabilization for 0 billable minutes including:  Bike x 5 minutes   Seated hamstring stretch   Slant board w/ quad set 10 x 10 sec     manual therapy techniques: Joint mobilizations, Manual traction, Myofacial release, Soft tissue Mobilization, and Friction Massage were applied for 0 minutes, including:  -    neuromuscular re-education activities to improve: Balance, Coordination, Kinesthetic Sense, Proprioception, and Posture for 25 minutes. The following activities were included:  Quad set x 5 min with NMES and both verbal and tactile cueing  Straight leg raise x 5 minutes with neuromuscular electrical stimulation with strap for assist  Short  arc quad x 5 minutes with neuromuscular electrical stimulation and strap  Closed chain terminal knee extension - ball on wall - 5sh x 20  Standing on bilateral feet without holding on to anything 3 x 30 second hold   Wobble board bilateral holding onto rails 5 x 20 seconds   Long arc quad w/ ball squeeze x 20 - left       therapeutic activities to improve functional performance for  20 minutes, including:  Bilateral leg press with cues for full extension 4 plates x 30 - ball b/t knees  Unilateral leg press 1 plate 10 x 10 second hold   Sit to stands at rail x 20 (one hand on rail)  Calf raises x 20 at rail  Marching at rail x 20  Standing hip abduction 2 x 10 each leg     gait training to improve functional mobility and safety for 0  minutes, including:  Gait training with bilateral axillary crutches with cueing to improve heel strike and toe off - has a tendency to hit flat footed and no rocking from heel to toe - she also requires cueing to put weight on the left lower extremity as when she changes directions she keeps weight off that leg    direct contact modalities after being cleared for contraindications:     supervised modalities after being cleared for contradictions: NMES Electrical Stimulation:  James received NMES Electrical Stimulation to elicit muscle contraction of the left quad. Pt received stimulation at a rate of 50 pps with symmetric current, ramp of 2 seconds with 10 second on time and 20 second off time for  15 minutes. Patient tolerated treatment well without any adverse effects.     biofeedback to isolate quad contraction, decrease cocontraction, and assist with neuromuscular reeducation for 0 minutes. Exercises performed with biofeedback Including: -      Patient Education and Home Exercises       Education provided:   - review of home exercise program and current Plan of Care/rationale of treatment.    Written Home Exercises Provided: Patient instructed to cont prior HEP. Exercises were  "reviewed and James was able to demonstrate them prior to the end of the session.  James demonstrated good understanding of the education provided. See EMR under Patient Instructions for exercises provided during therapy sessions    Assessment     At Evaluation:  James is a 29 y.o. female referred to outpatient Physical Therapy with a medical diagnosis of left knee pain. Patient arrived in a wheelchair with complaints of left knee pain that started on Halloween. She says the knee had been feeling "off" that day and she laid down for a little while. Then she says she got up to go to the bathroom and as she went to sit down on the commode her knee "popped and crunched" and scared her as she says her kneecap has dislocated a few times. She states she was too scared to attempt to stand up by herself and began using crutches until she went to a primary care doctor on 11/11/24. She had an xray that was normal. She was referred to ortho and went on 11/14/24. An MRI has been ordered but is not scheduled until 12/9. She states that she was told at both places to not put any weight on the leg until she had her MRI but this is not found in either of their notes. She was extremely guarded and apprehensive today. At the beginning of the evaluation she would not really move the knee. She was co-shahla severely and only had about 14 degrees of range of motion to start. Spent a lot of time trying to get patient to relax and convince her that her knee cap was not going to pop out of place if she moved her knee. After gentle passive range of motion was finally able to get knee to full extension. All of her pain complaints with special tests and range of motion were in the thigh musculature and not the knee joint itself. She had poor quad set. Kinesiotape was placed on left knee with half figure 8 on each side of patella to give quad feedback. By the end of the evaluation she was able to walk with rolling walker with CGA/SBA " and was able to go up/down steps with step to pattern and bilateral handrails.        Current Assessment:  James had her MRI - results showed lateral patellar tilt and PFC. NP stated when they call her with the results they will offer her an injection if she would like.  She arrived on bilateral crutches but ambulating with more weight on legs today and more confident gait. She was tight while initiating revolutions on bike but quickly loosened up and was able to go all the way around. She was resting at 0 degrees extension today and had much better control of straight leg raise - able to lift leg with less effort and no assistance with 10 degree quad lag.  She began working on balance with weight equally distributed between feet while holding on to rail. She was able to stand on firm surface without holding on with weight on bilateral feet and to stand on wobble board and equalized weight distribution while holding on to rail with both hands. Will continue to work on quad strength and control, range of motion, gait, and pain control. Continue current plan of care and progress as tolerated.    James Is progressing towards her goals.   Pt prognosis is Good.     Pt will continue to benefit from skilled outpatient physical therapy to address the deficits listed in the problem list box on initial evaluation, provide pt/family education and to maximize pt's level of independence in the home and community environment.     Pt's spiritual, cultural and educational needs considered and pt agreeable to plan of care and goals.     Anticipated barriers to physical therapy: none    SHORT TERM GOALS  1.  Patient to be independent with home exercise program to facilitate carryover between therapy visits.  2.  Patient will have 0-120 degrees range of motion left knee for improved mobility.  3.  Patient will perform straight leg raise and hold 10 seconds without quad lag for increased quad control.  4.  Patient will increase  manual muscle test of left lower extremity to 4+ to 5/5 for increased stability with gait and activities of daily living.     LONG TERM GOALS  1.  Patient will go up/down stairs reciprocal pattern with handrails as needed and good eccentric control on left lower extremity.  2.  Patient will ambulate independent without assistive device, without deviation and without pain in left knee.  3.  Patient will return to all normal daily activities.     Plan     Plan of care Certification: 11/19/2024 to 2/14/2025.     Outpatient Physical Therapy 2 times weekly for 12 weeks to include the following interventions: 72890 [therapeutic exercise], 37946 [neuromuscular re-education], 82159 [gait training], 54552 [manual therapy], 84067 [therapeutic activities], 83019 [aquatic therapy], 96912 [unattended electrical stimulation], 32067 [biofeedback by any modality], and 35301 [vasopneumatic device].     TED PIPER, PT   12/12/2024

## 2024-12-12 NOTE — TELEPHONE ENCOUNTER
----- Message from Ayleen sent at 12/12/2024  4:07 PM CST -----  Regarding: Medication Questions  Who Called: James Kirk    Patient has questions about Meloxicam. She said she ran out yesterday morning and was wondering what she needs to do.     Preferred Method of Contact: Phone Call  Patient's Preferred Phone Number on File: [phone]

## 2024-12-17 ENCOUNTER — CLINICAL SUPPORT (OUTPATIENT)
Dept: REHABILITATION | Facility: HOSPITAL | Age: 29
End: 2024-12-17
Payer: COMMERCIAL

## 2024-12-17 ENCOUNTER — OFFICE VISIT (OUTPATIENT)
Dept: ORTHOPEDICS | Facility: CLINIC | Age: 29
End: 2024-12-17
Payer: COMMERCIAL

## 2024-12-17 VITALS — HEIGHT: 62 IN | BODY MASS INDEX: 24.67 KG/M2 | WEIGHT: 134.06 LBS

## 2024-12-17 DIAGNOSIS — M22.40 CHONDROMALACIA OF PATELLA, UNSPECIFIED LATERALITY: Primary | ICD-10-CM

## 2024-12-17 DIAGNOSIS — R26.9 GAIT DIFFICULTY: ICD-10-CM

## 2024-12-17 DIAGNOSIS — M25.562 ACUTE PAIN OF LEFT KNEE: Primary | ICD-10-CM

## 2024-12-17 DIAGNOSIS — M25.662 DECREASED ROM OF LEFT KNEE: ICD-10-CM

## 2024-12-17 DIAGNOSIS — M62.81 WEAKNESS OF LEFT QUADRICEPS MUSCLE: ICD-10-CM

## 2024-12-17 PROCEDURE — 20610 DRAIN/INJ JOINT/BURSA W/O US: CPT | Mod: PBBFAC,LT

## 2024-12-17 PROCEDURE — 97110 THERAPEUTIC EXERCISES: CPT

## 2024-12-17 PROCEDURE — 97112 NEUROMUSCULAR REEDUCATION: CPT

## 2024-12-17 PROCEDURE — 99999PBSHW PR PBB SHADOW TECHNICAL ONLY FILED TO HB: Mod: PBBFAC,,,

## 2024-12-17 PROCEDURE — 99999 PR PBB SHADOW E&M-EST. PATIENT-LVL II: CPT | Mod: PBBFAC,,,

## 2024-12-17 PROCEDURE — 99212 OFFICE O/P EST SF 10 MIN: CPT | Mod: PBBFAC

## 2024-12-17 RX ORDER — BUPIVACAINE HYDROCHLORIDE 2.5 MG/ML
1 INJECTION, SOLUTION EPIDURAL; INFILTRATION; INTRACAUDAL
Status: DISCONTINUED | OUTPATIENT
Start: 2024-12-17 | End: 2024-12-17 | Stop reason: HOSPADM

## 2024-12-17 RX ORDER — TRIAMCINOLONE ACETONIDE 40 MG/ML
40 INJECTION, SUSPENSION INTRA-ARTICULAR; INTRAMUSCULAR
Status: DISCONTINUED | OUTPATIENT
Start: 2024-12-17 | End: 2024-12-17 | Stop reason: HOSPADM

## 2024-12-17 RX ADMIN — TRIAMCINOLONE ACETONIDE 40 MG: 40 INJECTION, SUSPENSION INTRA-ARTICULAR; INTRAMUSCULAR at 10:12

## 2024-12-17 RX ADMIN — BUPIVACAINE HYDROCHLORIDE 1 ML: 2.5 INJECTION, SOLUTION EPIDURAL; INFILTRATION; INTRACAUDAL at 10:12

## 2024-12-17 NOTE — PROGRESS NOTES
[facility]   Physical Therapy Treatment Note      Name: James Kirk  Clinic Number: [id number]    Therapy Diagnosis:   Encounter Diagnoses   Name Primary?    Acute pain of left knee Yes    Decreased ROM of left knee     Gait difficulty     Weakness of left quadriceps muscle      Physician: Shalini Hunt MD    Visit Date: 12/17/2024    Physician Orders: PT Eval and Treat   Medical Diagnosis from Referral: see above  Evaluation Date: 11/19/2024  Authorization Period Expiration: 2/17/2025  Plan of Care Expiration: 2/14/2025     Date of Surgery: n/a  Visit # / Visits authorized: 6/13     FOTO: 1/3 = 31    2/3 = 64     Precautions: Standard     PTA Visit #: 0/5     Time In: 11:35 am  Time Out: 12:00 pm  Total Billable Time: 25 minutes    Subjective     Pt reports: she just came from ortho getting a steroid injection  She was compliant with home exercise program.  Response to previous treatment: no complaints   Functional change: not using w/c inside home    Pain: 2/10  Location: left knee/thigh    Objective      0-130 degrees range of motion left knee on dismissal     Treatment     James received the treatments listed below:      therapeutic exercises to develop strength, endurance, ROM, flexibility, posture, and core stabilization for 15 minutes including:  Hamstring rolls on peanut x 30  Bridging on peanut x 20  Ideal stretch 10 x 10sh    Bike x 5 minutes   Seated hamstring stretch   Slant board w/ quad set 10 x 10 sec     manual therapy techniques: Joint mobilizations, Manual traction, Myofacial release, Soft tissue Mobilization, and Friction Massage were applied for 0 minutes, including:  -    neuromuscular re-education activities to improve: Balance, Coordination, Kinesthetic Sense, Proprioception, and Posture for 10 minutes. The following activities were included:  Terminal knee extension w/ towel roll - 5sh x 20  Straight leg raise 3 x 5 w/out strap    (Not today)  Quad set  x 5 min with NMES and both verbal and tactile cueing  Straight leg raise x 5 minutes with neuromuscular electrical stimulation with strap for assist  Short arc quad x 5 minutes with neuromuscular electrical stimulation and strap  Closed chain terminal knee extension - ball on wall - 5sh x 20  Standing on bilateral feet without holding on to anything 3 x 30 second hold   Wobble board bilateral holding onto rails 5 x 20 seconds   Long arc quad w/ ball squeeze x 20 - left       therapeutic activities to improve functional performance for  0 minutes, including:  Bilateral leg press with cues for full extension 4 plates x 30 - ball b/t knees  Unilateral leg press 1 plate 10 x 10 second hold   Sit to stands at rail x 20 (one hand on rail)  Calf raises x 20 at rail  Marching at rail x 20  Standing hip abduction 2 x 10 each leg     gait training to improve functional mobility and safety for 0  minutes, including:  Gait training with bilateral axillary crutches with cueing to improve heel strike and toe off - has a tendency to hit flat footed and no rocking from heel to toe - she also requires cueing to put weight on the left lower extremity as when she changes directions she keeps weight off that leg    direct contact modalities after being cleared for contraindications:     supervised modalities after being cleared for contradictions: NMES Electrical Stimulation:  James received NMES Electrical Stimulation to elicit muscle contraction of the left quad. Pt received stimulation at a rate of 50 pps with symmetric current, ramp of 2 seconds with 10 second on time and 20 second off time for  0 minutes. Patient tolerated treatment well without any adverse effects.     biofeedback to isolate quad contraction, decrease cocontraction, and assist with neuromuscular reeducation for 0 minutes. Exercises performed with biofeedback Including: -      Patient Education and Home Exercises       Education provided:   - review of home  "exercise program and current Plan of Care/rationale of treatment.    Written Home Exercises Provided: Patient instructed to cont prior HEP. Exercises were reviewed and James was able to demonstrate them prior to the end of the session.  James demonstrated good understanding of the education provided. See EMR under Patient Instructions for exercises provided during therapy sessions    Assessment     At Evaluation:  James is a 29 y.o. female referred to outpatient Physical Therapy with a medical diagnosis of left knee pain. Patient arrived in a wheelchair with complaints of left knee pain that started on Halloween. She says the knee had been feeling "off" that day and she laid down for a little while. Then she says she got up to go to the bathroom and as she went to sit down on the commode her knee "popped and crunched" and scared her as she says her kneecap has dislocated a few times. She states she was too scared to attempt to stand up by herself and began using crutches until she went to a primary care doctor on 11/11/24. She had an xray that was normal. She was referred to ortho and went on 11/14/24. An MRI has been ordered but is not scheduled until 12/9. She states that she was told at both places to not put any weight on the leg until she had her MRI but this is not found in either of their notes. She was extremely guarded and apprehensive today. At the beginning of the evaluation she would not really move the knee. She was co-shahla severely and only had about 14 degrees of range of motion to start. Spent a lot of time trying to get patient to relax and convince her that her knee cap was not going to pop out of place if she moved her knee. After gentle passive range of motion was finally able to get knee to full extension. All of her pain complaints with special tests and range of motion were in the thigh musculature and not the knee joint itself. She had poor quad set. Kinesiotape was placed on left " knee with half figure 8 on each side of patella to give quad feedback. By the end of the evaluation she was able to walk with rolling walker with CGA/SBA and was able to go up/down steps with step to pattern and bilateral handrails.        Current Assessment:  James just came from ortho getting steroid injection in the left knee. Activities were kept light today due to this. Only did mat exercises. She still avoids going into full extension. Had her stretch with the ideal stretch to address. She still has a lag with straight leg raise. She arrived with both crutches although she was gait trained with one crutch last visit.  Her FOTO score did improve from 31 at evaluation to 64 today showing some functional improvement. Will continue to work on quad strength and control, range of motion, gait, and pain control. Continue current plan of care and progress as tolerated.    James Is progressing towards her goals.   Pt prognosis is Good.     Pt will continue to benefit from skilled outpatient physical therapy to address the deficits listed in the problem list box on initial evaluation, provide pt/family education and to maximize pt's level of independence in the home and community environment.     Pt's spiritual, cultural and educational needs considered and pt agreeable to plan of care and goals.     Anticipated barriers to physical therapy: none    SHORT TERM GOALS  1.  Patient to be independent with home exercise program to facilitate carryover between therapy visits.  2.  Patient will have 0-120 degrees range of motion left knee for improved mobility.  3.  Patient will perform straight leg raise and hold 10 seconds without quad lag for increased quad control.  4.  Patient will increase manual muscle test of left lower extremity to 4+ to 5/5 for increased stability with gait and activities of daily living.     LONG TERM GOALS  1.  Patient will go up/down stairs reciprocal pattern with handrails as needed and good  eccentric control on left lower extremity.  2.  Patient will ambulate independent without assistive device, without deviation and without pain in left knee.  3.  Patient will return to all normal daily activities.     Plan     Plan of care Certification: 11/19/2024 to 2/14/2025.     Outpatient Physical Therapy 2 times weekly for 12 weeks to include the following interventions: 87391 [therapeutic exercise], 86952 [neuromuscular re-education], 10995 [gait training], 18235 [manual therapy], 42702 [therapeutic activities], 31171 [aquatic therapy], 18047 [unattended electrical stimulation], 02015 [biofeedback by any modality], and 82975 [vasopneumatic device].     TED PIPER, PT   12/17/2024

## 2024-12-17 NOTE — PROCEDURES
Large Joint Aspiration/Injection: L knee    Date/Time: 12/17/2024 10:00 AM    Performed by: Starr Becerra PA  Authorized by: Starr Becerra PA    Consent Done?:  Yes (Verbal)  Indications:  Arthritis and pain  Site marked: the procedure site was marked      Details:  Needle Size:  22 G  Approach:  Anterolateral  Location:  Knee  Site:  L knee  Medications:  1 mL BUPivacaine (PF) 0.25% (2.5 mg/ml) 0.25 % (2.5 mg/mL); 40 mg triamcinolone acetonide 40 mg/mL  Patient tolerance:  Patient tolerated the procedure well with no immediate complications

## 2024-12-17 NOTE — PROGRESS NOTES
CC:   Chief Complaint   Patient presents with    Injections     Patient is being seen for left knee steroid injection. Mri read patella tilt and patellofemoral chondromalacia.        James Kirk is a 29 y.o. female seen today for follow up of Injections (Patient is being seen for left knee steroid injection. Mri read patella tilt and patellofemoral chondromalacia.)  Recent MRI on 12/09/2024 of left knee showing chondromalacia patella with a delta.  Previously called patient's spoke with the options including injection physical therapy.  Patient is currently in physical therapy and has had 4 visits.  She is doing well.  She presents today for injection of left knee.  No new complaints today.        PAST MEDICAL HISTORY: No past medical history on file.     PAST SURGICAL HISTORY: No past surgical history on file.     ALLERGIES: Review of patient's allergies indicates:  No Known Allergies     MEDICATIONS :  No current outpatient medications on file.     SOCIAL HISTORY:   Social History     Socioeconomic History    Marital status: Single   Tobacco Use    Smoking status: Never    Smokeless tobacco: Never   Substance and Sexual Activity    Alcohol use: Not Currently    Drug use: Never    Sexual activity: Yes     Social Drivers of Health     Financial Resource Strain: Patient Declined (11/20/2024)    Overall Financial Resource Strain (CARDIA)     Difficulty of Paying Living Expenses: Patient declined   Food Insecurity: Patient Declined (11/20/2024)    Hunger Vital Sign     Worried About Running Out of Food in the Last Year: Patient declined     Ran Out of Food in the Last Year: Patient declined   Physical Activity: Insufficiently Active (11/20/2024)    Exercise Vital Sign     Days of Exercise per Week: 1 day     Minutes of Exercise per Session: 20 min   Stress: Stress Concern Present (11/20/2024)    [place] [employer] - Occupational Stress Questionnaire     Feeling of Stress : To some  extent   Housing Stability: Unknown (11/20/2024)    Housing Stability Vital Sign     Unable to Pay for Housing in the Last Year: Patient declined        FAMILY HISTORY: No family history on file.       PHYSICAL EXAM:      There were no vitals filed for this visit.  Body mass index is 24.52 kg/m².    GENERAL: Well-developed, well-nourished female . The patient is alert, oriented and cooperative.    EXTREMITIES:  Left knee was skin clean dry and intact, positive patellar grind testing, range of motion from 0-90 degrees, neurovascularly intact      RADIOGRAPHIC FINDINGS:   MRI Knee Without Contrast Left    Result Date: 12/11/2024  EXAMINATION: MRI KNEE WITHOUT CONTRAST LEFT CLINICAL HISTORY: Knee pain, chronic, negative xray (Age >= 5y);Pain in left knee TECHNIQUE: Multiplanar, multisequence images were performed about the knee. COMPARISON: X-ray dated November 11, 2024 FINDINGS: The anterior and posterior cruciate ligaments appear intact.  The medial collateral and lateral collateral ligamentous complexes appear intact.  There is no evidence of meniscal tearing or osteochondral injury.  There is moderate lateral patellar tilt.  There is some increased signal in the patellofemoral cartilage raising the question of patellofemoral chondromalacia.  The regional marrow signal is within normal limits.  The patellar tendon is intact     Lateral patellar tilt and patellofemoral chondromalacia. Otherwise unremarkable MRI of the left knee. Electronically signed by: Jann Hobson MD Date:    12/11/2024 Time:    16:04       Patient Active Problem List    Diagnosis Date Noted    Acute pain of left knee 11/25/2024    Decreased ROM of left knee 11/25/2024    Gait difficulty 11/25/2024    Weakness of left quadriceps muscle 11/25/2024    Pain and swelling of knee, left 11/11/2024    Injury of left knee 11/11/2024       IMPRESSION AND PLAN:  Chondromalacia patella.  Patient given steroid injection of the left knee today, see  procedural note for details.  Discussed with the patient that these can be repeated every 3-4 months as needed.  Discussed continuation of physical therapy for range of motion and strengthening exercises.  Follow up with the orthopedic clinic p.r.n..      No follow-ups on file.       Starr Becerra PA-C      (Subject to voice recognition error, transcription service not allowed)

## 2024-12-19 ENCOUNTER — OFFICE VISIT (OUTPATIENT)
Dept: FAMILY MEDICINE | Facility: CLINIC | Age: 29
End: 2024-12-19
Payer: COMMERCIAL

## 2024-12-19 ENCOUNTER — CLINICAL SUPPORT (OUTPATIENT)
Dept: REHABILITATION | Facility: HOSPITAL | Age: 29
End: 2024-12-19
Payer: COMMERCIAL

## 2024-12-19 VITALS
DIASTOLIC BLOOD PRESSURE: 72 MMHG | OXYGEN SATURATION: 96 % | HEART RATE: 98 BPM | SYSTOLIC BLOOD PRESSURE: 114 MMHG | HEIGHT: 62 IN | WEIGHT: 130 LBS | RESPIRATION RATE: 18 BRPM | BODY MASS INDEX: 23.92 KG/M2

## 2024-12-19 DIAGNOSIS — Z28.21 NO VACCINATION-PT REFUSE: ICD-10-CM

## 2024-12-19 DIAGNOSIS — M25.662 DECREASED ROM OF LEFT KNEE: ICD-10-CM

## 2024-12-19 DIAGNOSIS — Z13.9 SCREENING DUE: ICD-10-CM

## 2024-12-19 DIAGNOSIS — M22.42 CHONDROMALACIA OF PATELLOFEMORAL JOINT, LEFT: Primary | ICD-10-CM

## 2024-12-19 DIAGNOSIS — M25.562 LEFT KNEE PAIN, UNSPECIFIED CHRONICITY: ICD-10-CM

## 2024-12-19 DIAGNOSIS — M62.81 WEAKNESS OF LEFT QUADRICEPS MUSCLE: ICD-10-CM

## 2024-12-19 DIAGNOSIS — R26.9 GAIT DIFFICULTY: ICD-10-CM

## 2024-12-19 DIAGNOSIS — M25.562 ACUTE PAIN OF LEFT KNEE: Primary | ICD-10-CM

## 2024-12-19 LAB
ALBUMIN SERPL BCP-MCNC: 4.6 G/DL (ref 3.5–5)
ALBUMIN/GLOB SERPL: 1.4 {RATIO}
ALP SERPL-CCNC: 58 U/L (ref 40–150)
ALT SERPL W P-5'-P-CCNC: 26 U/L
ANION GAP SERPL CALCULATED.3IONS-SCNC: 13 MMOL/L (ref 7–16)
AST SERPL W P-5'-P-CCNC: 21 U/L (ref 5–34)
BILIRUB SERPL-MCNC: 0.4 MG/DL
BUN SERPL-MCNC: 10 MG/DL (ref 7–19)
BUN/CREAT SERPL: 14 (ref 6–20)
CALCIUM SERPL-MCNC: 9.7 MG/DL (ref 8.4–10.2)
CHLORIDE SERPL-SCNC: 104 MMOL/L (ref 98–107)
CHOLEST SERPL-MCNC: 177 MG/DL
CHOLEST/HDLC SERPL: 2.7 {RATIO}
CO2 SERPL-SCNC: 25 MMOL/L (ref 22–29)
CREAT SERPL-MCNC: 0.69 MG/DL (ref 0.55–1.02)
EGFR (NO RACE VARIABLE) (RUSH/TITUS): 121 ML/MIN/1.73M2
GLOBULIN SER-MCNC: 3.4 G/DL (ref 2–4)
GLUCOSE SERPL-MCNC: 93 MG/DL (ref 74–100)
HCV AB SER QL: NORMAL
HDLC SERPL-MCNC: 65 MG/DL (ref 35–60)
HIV 1+O+2 AB SERPL QL: NORMAL
LDLC SERPL CALC-MCNC: 98 MG/DL
LDLC/HDLC SERPL: 1.5 {RATIO}
NONHDLC SERPL-MCNC: 112 MG/DL
POTASSIUM SERPL-SCNC: 4.3 MMOL/L (ref 3.5–5.1)
PROT SERPL-MCNC: 8 G/DL (ref 6.4–8.3)
SODIUM SERPL-SCNC: 138 MMOL/L (ref 136–145)
TRIGL SERPL-MCNC: 69 MG/DL (ref 37–140)
VLDLC SERPL-MCNC: 14 MG/DL

## 2024-12-19 PROCEDURE — 97112 NEUROMUSCULAR REEDUCATION: CPT | Mod: CQ

## 2024-12-19 PROCEDURE — 97110 THERAPEUTIC EXERCISES: CPT | Mod: CQ

## 2024-12-19 RX ORDER — MELOXICAM 7.5 MG/1
7.5 TABLET ORAL DAILY
Qty: 30 TABLET | Refills: 1 | Status: SHIPPED | OUTPATIENT
Start: 2024-12-19 | End: 2025-02-17

## 2024-12-19 NOTE — PROGRESS NOTES
[facility]   Physical Therapy Treatment Note      Name: James Kirk  Clinic Number: [id number]    Therapy Diagnosis:   Encounter Diagnoses   Name Primary?    Acute pain of left knee Yes    Decreased ROM of left knee     Gait difficulty     Weakness of left quadriceps muscle      Physician: Shalini Hunt MD    Visit Date: 12/19/2024    Physician Orders: PT Eval and Treat   Medical Diagnosis from Referral: see above  Evaluation Date: 11/19/2024  Authorization Period Expiration: 2/17/2025  Plan of Care Expiration: 2/14/2025     Date of Surgery: n/a  Visit # / Visits authorized: 6/13     FOTO: 1/3 = 31    2/3 = 64     Precautions: Standard     PTA Visit #: 1/5     Time In: 1300  Time Out: 1350  Total Billable Time: 50 minutes    Subjective     Pt reports: she just saw primary doctor who gave her melexocam and suggested she get a brace. Have felt like knee moves better since injection. Have gone back to using Jed crutches.  She was compliant with home exercise program.  Response to previous treatment: no complaints   Functional change: not using w/c inside home    Pain: 2/10  Location: left knee/thigh    Objective      0-130 degrees range of motion left knee on dismissal     Treatment     James received the treatments listed below:      therapeutic exercises to develop strength, endurance, ROM, flexibility, posture, and core stabilization for 15 minutes including:  Bike x 5 minutes   Seated hamstring stretch   Slant board w/ quad set 10 x 10 sec   QUAD SET x 10 with 10 sec hold   manual therapy techniques: Joint mobilizations, Manual traction, Myofacial release, Soft tissue Mobilization, and Friction Massage were applied for 5 minutes, including:  Patellar mobs    neuromuscular re-education activities to improve: Balance, Coordination, Kinesthetic Sense, Proprioception, and Posture for 22 minutes. The following activities were included:  Terminal knee extension w/ towel roll - 5sh  x 20  Straight leg raise 4 x 5 w/out strap  SL hip adduction 5 x 3  [facility] taping for patellar tracking   (Not today)  Quad set x 5 min with NMES and both verbal and tactile cueing  Straight leg raise x 5 minutes with neuromuscular electrical stimulation with strap for assist  Short arc quad x 5 minutes with neuromuscular electrical stimulation and strap  Closed chain terminal knee extension - ball on wall - 5sh x 20  Standing on bilateral feet without holding on to anything 3 x 30 second hold   Wobble board bilateral holding onto rails 5 x 20 seconds   Long arc quad w/ ball squeeze x 20 - left       therapeutic activities to improve functional performance for  0 minutes, including:  Bilateral leg press with cues for full extension 4 plates x 30 - ball b/t knees  Unilateral leg press 1 plate 10 x 10 second hold   Sit to stands at rail x 20 (one hand on rail)  Calf raises x 20 at rail  Marching at rail x 20  Standing hip abduction 2 x 10 each leg     gait training to improve functional mobility and safety for 8 minutes, including:  Gait training with bilateral axillary crutches with cueing to improve heel strike and toe off - has a tendency to hit flat footed and no rocking from heel to toe - she also requires cueing to put weight on the left lower extremity as when she changes directions she keeps weight off that leg    direct contact modalities after being cleared for contraindications:     supervised modalities after being cleared for contradictions: NMES Electrical Stimulation:  James received NMES Electrical Stimulation to elicit muscle contraction of the left quad. Pt received stimulation at a rate of 50 pps with symmetric current, ramp of 2 seconds with 10 second on time and 20 second off time for  0 minutes. Patient tolerated treatment well without any adverse effects.     biofeedback to isolate quad contraction, decrease cocontraction, and assist with neuromuscular reeducation for 0 minutes. Exercises  "performed with biofeedback Including: -      Patient Education and Home Exercises       Education provided:   - review of home exercise program and current Plan of Care/rationale of treatment.    Written Home Exercises Provided: Patient instructed to cont prior HEP. Exercises were reviewed and James was able to demonstrate them prior to the end of the session.  James demonstrated good understanding of the education provided. See EMR under Patient Instructions for exercises provided during therapy sessions    Assessment     At Evaluation:  James is a 29 y.o. female referred to outpatient Physical Therapy with a medical diagnosis of left knee pain. Patient arrived in a wheelchair with complaints of left knee pain that started on Halloween. She says the knee had been feeling "off" that day and she laid down for a little while. Then she says she got up to go to the bathroom and as she went to sit down on the commode her knee "popped and crunched" and scared her as she says her kneecap has dislocated a few times. She states she was too scared to attempt to stand up by herself and began using crutches until she went to a primary care doctor on 11/11/24. She had an xray that was normal. She was referred to ortho and went on 11/14/24. An MRI has been ordered but is not scheduled until 12/9. She states that she was told at both places to not put any weight on the leg until she had her MRI but this is not found in either of their notes. She was extremely guarded and apprehensive today. At the beginning of the evaluation she would not really move the knee. She was co-shahla severely and only had about 14 degrees of range of motion to start. Spent a lot of time trying to get patient to relax and convince her that her knee cap was not going to pop out of place if she moved her knee. After gentle passive range of motion was finally able to get knee to full extension. All of her pain complaints with special tests and range " of motion were in the thigh musculature and not the knee joint itself. She had poor quad set. Kinesiotape was placed on left knee with half figure 8 on each side of patella to give quad feedback. By the end of the evaluation she was able to walk with rolling walker with CGA/SBA and was able to go up/down steps with step to pattern and bilateral handrails.        Current Assessment:  James is very apprehensive with each activity including turning over on mat table. She could perform SLR without extension lag with lots of cueing.Able to correct gait and encouraged her to return to U crutch use  James Is progressing towards her goals.   Pt prognosis is Good.     Pt will continue to benefit from skilled outpatient physical therapy to address the deficits listed in the problem list box on initial evaluation, provide pt/family education and to maximize pt's level of independence in the home and community environment.     Pt's spiritual, cultural and educational needs considered and pt agreeable to plan of care and goals.     Anticipated barriers to physical therapy: none    SHORT TERM GOALS  1.  Patient to be independent with home exercise program to facilitate carryover between therapy visits.  2.  Patient will have 0-120 degrees range of motion left knee for improved mobility.  3.  Patient will perform straight leg raise and hold 10 seconds without quad lag for increased quad control.  4.  Patient will increase manual muscle test of left lower extremity to 4+ to 5/5 for increased stability with gait and activities of daily living.     LONG TERM GOALS  1.  Patient will go up/down stairs reciprocal pattern with handrails as needed and good eccentric control on left lower extremity.  2.  Patient will ambulate independent without assistive device, without deviation and without pain in left knee.  3.  Patient will return to all normal daily activities.     Plan     Plan of care Certification: 11/19/2024 to 2/14/2025.      Outpatient Physical Therapy 2 times weekly for 12 weeks to include the following interventions: 51616 [therapeutic exercise], 20612 [neuromuscular re-education], 89226 [gait training], 28728 [manual therapy], 77512 [therapeutic activities], 68112 [aquatic therapy], 97595 [unattended electrical stimulation], 27127 [biofeedback by any modality], and 28939 [vasopneumatic device].     Yesenia Garcia, PTA   12/19/2024

## 2024-12-19 NOTE — ASSESSMENT & PLAN NOTE
"Monitor  left knee pain 1-month followup. Patient saw Dr. Burch on 11/11/24 for her left knee pain. Patient had an injury with left knee popping around Halloween time and has been having pain and muscle spasms since. She said the left knee went buckle out from under her when she was sitting on the toilet and trying to stand up.     Swelling has resolved and no more "buckle out" episodes but still have persistent pain 6/10 with movement and 0/10 at rest. Pain is improved per patient. Denies f/c/cp/sob/n/v/d. Denies appetite, urinary, or bowel habit changes. She ran out of Mobic today and needed refills. PT has been helping significantly and she wanted more PT sessions too due to ongoing pain. She was on a wheelchair last visit but is using crutches to help today.     Evaluate  Crutches seen on exam, mild tenderness to left knee joint and mildly decrease left knee ROM. Otherwise unremarkable exam - see PE  XR left knee  (11/11/24) showed mild degenerative changes but no acute findings, no fracture or dislocation, no ST swelling, no suprapatellar effusion. MRI left knee (12/9/24) showed lateral patellar tilt and patellofemoral chondromalacia.  All labs from 11/11/24 were unremarkable, CRP < 0.29, ESR 2 wnl, CBC wnl, WBC 6.43, TSH 3.06 wnl.     Assess  Left patellofemoral chondromalacia     Treat  Checking CMP since patient is on Mobic. LMP ongoing now and started 12/17/24, but patient is on Nexplanon for birth control and no chance she could be pregnant.  Refilled mobic 7.5 qd, otc tylenol prn per ortho  Referred to PT again for another round (current PT ending soon)  Continue otc Mg supplement prn for left knee muscle spasm (significant help per patient)  Continue ice, elevation, PT outpatient  F/u 1-2 months  "

## 2024-12-19 NOTE — ASSESSMENT & PLAN NOTE
Patient would like us to be her PCP. She hasn't seen a PCP in years.  Checking lipid panel (fasting), HIV, Hep C today  Refer to clinic midwife for pap smear  F/u 1-2mo

## 2024-12-19 NOTE — PROGRESS NOTES
"Subjective:       Patient ID: James Kirk is a 29 y.o. female.    Chief Complaint: Follow-up (Pt is in for a follow up for left knee injury ) and Medication Refill (Meloxicam 7.5 mg )    28 yo F with a PMH of left knee injury and chronic left knee pain who presents to [facility] clinic with left knee pain 1-month followup. Patient saw Dr. Burch on 11/11/24 for her left knee pain. Patient had an injury with left knee popping around Halloween time and has been having pain and muscle spasms since. She said the left knee went buckle out from under her when she was sitting on the toilet and trying to stand up.     Swelling has resolved and no more "buckle out" episodes but still have persistent pain 6/10 with movement and 0/10 at rest. Pain is improved per patient. Denies f/c/cp/sob/n/v/d. Denies appetite, urinary, or bowel habit changes. She ran out of Mobic today and needed refills. PT has been helping significantly and she wanted more PT sessions too due to ongoing pain. She was on a wheelchair last visit but is using crutches to help today.          Current Outpatient Medications:     meloxicam (MOBIC) 7.5 MG tablet, Take 1 tablet (7.5 mg total) by mouth once daily., Disp: 30 tablet, Rfl: 1    Review of patient's allergies indicates:  No Known Allergies    History reviewed. No pertinent past medical history.    History reviewed. No pertinent surgical history.    History reviewed. No pertinent family history.    Social History     Tobacco Use    Smoking status: Never    Smokeless tobacco: Never   Substance Use Topics    Alcohol use: Not Currently    Drug use: Never       Review of Systems   Constitutional:  Negative for chills, diaphoresis, fatigue and fever.   HENT:  Negative for trouble swallowing.    Eyes:  Negative for visual disturbance.   Respiratory:  Negative for cough and shortness of breath.    Cardiovascular:  Negative for chest pain.   Gastrointestinal:  Negative for abdominal pain, nausea and vomiting. " "  Endocrine: Negative for polyuria.   Genitourinary:  Negative for bladder incontinence, difficulty urinating and dysuria.   Musculoskeletal:  Positive for arthralgias. Negative for joint deformity.   Integumentary:  Negative for wound.   Neurological:  Negative for syncope and headaches.   Psychiatric/Behavioral:  Negative for sleep disturbance.          Current Medications:   Medication List with Changes/Refills   Changed and/or Refilled Medications    Modified Medication Previous Medication    MELOXICAM (MOBIC) 7.5 MG TABLET meloxicam (MOBIC) 7.5 MG tablet       Take 1 tablet (7.5 mg total) by mouth once daily.    Take 1 tablet (7.5 mg total) by mouth once daily.       Start Date: 12/19/2024End Date: 2/17/2025    Start Date: 11/11/2024End Date: 12/19/2024            Objective:        Vitals:    12/19/24 1005   BP: 114/72   BP Location: Left arm   Patient Position: Sitting   Pulse: 98   Resp: 18   SpO2: 96%   Weight: 59 kg (130 lb)   Height: 5' 2" (1.575 m)       Physical Exam  Vitals and nursing note reviewed. Exam conducted with a chaperone present.   Constitutional:       General: She is not in acute distress.     Appearance: Normal appearance. She is normal weight. She is not ill-appearing, toxic-appearing or diaphoretic.   HENT:      Head: Normocephalic and atraumatic.      Nose: Nose normal.      Mouth/Throat:      Mouth: Mucous membranes are moist.      Pharynx: Oropharynx is clear. No oropharyngeal exudate or posterior oropharyngeal erythema.   Eyes:      General: No scleral icterus.        Right eye: No discharge.         Left eye: No discharge.      Pupils: Pupils are equal, round, and reactive to light.   Cardiovascular:      Rate and Rhythm: Normal rate and regular rhythm.      Pulses: Normal pulses.      Heart sounds: Normal heart sounds. No murmur heard.  Pulmonary:      Effort: Pulmonary effort is normal. No respiratory distress.      Breath sounds: Normal breath sounds. No wheezing.   Abdominal:    " "  General: Abdomen is flat. There is no distension.   Musculoskeletal:         General: Tenderness (left knee) present. No swelling or deformity.      Cervical back: Neck supple.      Right lower leg: No edema.      Left lower leg: No edema.      Comments: Left knee mildly decreased ROM   Skin:     General: Skin is warm and dry.      Coloration: Skin is not jaundiced.      Findings: No lesion.   Neurological:      Mental Status: She is alert.      Sensory: No sensory deficit.   Psychiatric:         Mood and Affect: Mood normal.         Behavior: Behavior normal.               Lab Results   Component Value Date    WBC 6.43 11/11/2024    HGB 14.7 11/11/2024    HCT 44.7 11/11/2024     11/11/2024    TSH 3.060 11/11/2024      Assessment:       1. Chondromalacia of patellofemoral joint, left    2. Left knee pain, unspecified chronicity    3. Screening due    4. No vaccination-pt refuse        Plan:         Problem List Items Addressed This Visit          ID    No vaccination-pt refuse     Refused all vaccines today including covid and flu, tetanus            Orthopedic    Chondromalacia of patellofemoral joint, left - Primary     Monitor  left knee pain 1-month followup. Patient saw Dr. Burch on 11/11/24 for her left knee pain. Patient had an injury with left knee popping around Halloween time and has been having pain and muscle spasms since. She said the left knee went buckle out from under her when she was sitting on the toilet and trying to stand up.     Swelling has resolved and no more "buckle out" episodes but still have persistent pain 6/10 with movement and 0/10 at rest. Pain is improved per patient. Denies f/c/cp/sob/n/v/d. Denies appetite, urinary, or bowel habit changes. She ran out of [employer] today and needed refills. PT has been helping significantly and she wanted more PT sessions too due to ongoing pain. She was on a wheelchair last visit but is using crutches to help today.     Evaluate  Crutches " seen on exam, mild tenderness to left knee joint and mildly decrease left knee ROM. Otherwise unremarkable exam - see PE  XR left knee  (11/11/24) showed mild degenerative changes but no acute findings, no fracture or dislocation, no ST swelling, no suprapatellar effusion. MRI left knee (12/9/24) showed lateral patellar tilt and patellofemoral chondromalacia.  All labs from 11/11/24 were unremarkable, CRP < 0.29, ESR 2 wnl, CBC wnl, WBC 6.43, TSH 3.06 wnl.     Assess  Left patellofemoral chondromalacia     Treat  Checking CMP since patient is on Mobic. LMP ongoing now and started 12/17/24, but patient is on Nexplanon for birth control and no chance she could be pregnant.  Refilled mobic 7.5 qd, otc tylenol prn per ortho  Referred to PT again for another round (current PT ending soon)  Continue otc Mg supplement prn for left knee muscle spasm (significant help per patient)  Continue ice, elevation, PT outpatient  F/u 1-2 months         Left knee pain     2/2 patellofemoral chondromalacia, see patellofemoral chondromalacia         Relevant Medications    meloxicam (MOBIC) 7.5 MG tablet    Other Relevant Orders    Ambulatory Referral/Consult to Physical Therapy       Palliative Care    Screening due     Patient would like us to be her PCP. She hasn't seen a PCP in years.  Checking lipid panel (fasting), HIV, Hep C today  Refer to clinic midwife for pap smear  F/u 1-2mo         Relevant Orders    Comprehensive Metabolic Panel    Lipid Panel    HIV 1/2 Ag/Ab (4th Gen)    Hepatitis C Antibody         Follow up in 2 months (on 2/19/2025), or if symptoms worsen or fail to improve.    Arsenio Mtz DO     Instructed patient that if symptoms fail to improve or worsen patient should seek immediate medical attention or report to the nearest emergency department. Patient expressed verbal agreement and understanding to this plan of care.

## 2024-12-26 ENCOUNTER — CLINICAL SUPPORT (OUTPATIENT)
Dept: REHABILITATION | Facility: HOSPITAL | Age: 29
End: 2024-12-26
Payer: COMMERCIAL

## 2024-12-26 DIAGNOSIS — M25.662 DECREASED ROM OF LEFT KNEE: ICD-10-CM

## 2024-12-26 DIAGNOSIS — R26.9 GAIT DIFFICULTY: ICD-10-CM

## 2024-12-26 DIAGNOSIS — M25.562 ACUTE PAIN OF LEFT KNEE: Primary | ICD-10-CM

## 2024-12-26 DIAGNOSIS — M62.81 WEAKNESS OF LEFT QUADRICEPS MUSCLE: ICD-10-CM

## 2024-12-26 PROCEDURE — 97116 GAIT TRAINING THERAPY: CPT | Mod: CQ

## 2024-12-26 PROCEDURE — 97110 THERAPEUTIC EXERCISES: CPT | Mod: CQ

## 2024-12-26 PROCEDURE — 97112 NEUROMUSCULAR REEDUCATION: CPT | Mod: CQ

## 2024-12-26 NOTE — PROGRESS NOTES
[facility]   Physical Therapy Treatment Note      Name: James Kirk  Clinic Number: [id number]    Therapy Diagnosis:   Encounter Diagnoses   Name Primary?    Acute pain of left knee Yes    Decreased ROM of left knee     Gait difficulty     Weakness of left quadriceps muscle      Physician: Shalini Hunt MD    Visit Date: 12/26/2024    Physician Orders: PT Eval and Treat   Medical Diagnosis from Referral: see above  Evaluation Date: 11/19/2024  Authorization Period Expiration: 2/17/2025  Plan of Care Expiration: 2/14/2025     Date of Surgery: n/a  Visit # / Visits authorized: 6/13     FOTO: 1/3 = 31    2/3 = 64     Precautions: Standard     PTA Visit #: 2/5     Time In: 1300  Time Out: 1350  Total Billable Time: 50 minutes    Subjective     Pt reports: he had panic attack with leg shaking while sitting in wheelchair leaning back to let her mom wash her hair. Thinks the tape helped some, able to keep it on since last visit. Can't step over into tub for shower since she can't walk. Has trouble with anxiety and panic.  She was compliant with home exercise program.  Response to previous treatment: no complaints   Functional change: not using w/c inside home    Pain: 1/10  Location: left knee/thigh    Objective      0-130 degrees range of motion left knee on dismissal     Treatment     James received the treatments listed below:      therapeutic exercises to develop strength, endurance, ROM, flexibility, posture, and core stabilization for 15 minutes including:  Bike x 5 minutes NOT THIS VISIT   Seated hamstring stretch   Supine hip abduction/adduction slides on table x 20  Slant board w/ quad set 10 x 10 sec NOT THIS VISIT   QUAD SET x 10 with 10 sec hold   manual therapy techniques: Joint mobilizations, Manual traction, Myofacial release, Soft tissue Mobilization, and Friction Massage were applied for 5 minutes, including:  Patellar mobs    neuromuscular re-education activities to  improve: Balance, Coordination, Kinesthetic Sense, Proprioception, and Posture for 22 minutes. The following activities were included:  Terminal knee extension w/ towel roll - 5sh x 20  Straight leg raise 2 x 10 w/out strap  SL hip adduction 5 x 3  [facility] taping for patellar tracking   (Not today)  Quad set x 5 min with NMES and both verbal and tactile cueing  Straight leg raise x 5 minutes with neuromuscular electrical stimulation with strap for assist  Short arc quad x 5 minutes with neuromuscular electrical stimulation and strap  Closed chain terminal knee extension - ball on wall - 5sh x 20  Standing on bilateral feet without holding on to anything 3 x 30 second hold   Wobble board bilateral holding onto rails 5 x 20 seconds   Long arc quad w/ ball squeeze x 20 - left       therapeutic activities to improve functional performance for  0 minutes, including:  Bilateral leg press with cues for full extension 4 plates x 30 - ball b/t knees  Unilateral leg press 1 plate 10 x 10 second hold   Sit to stands at rail x 20 (one hand on rail)  Calf raises x 20 at rail  Marching at rail x 20  Standing hip abduction 2 x 10 each leg     gait training to improve functional mobility and safety for 15 minutes, including:  Gait training with bilateral axillary crutches with cueing to improve heel strike and toe of, use knee flexion to clear foot on swing through - has a tendency to hit flat footed and no rocking from heel to toe - she also requires cueing to put weight on the left lower extremity as when she changes directions she keeps weight off that leg. Instructed to use one crutch only unless she is in a crowd or uneven ground    direct contact modalities after being cleared for contraindications:     supervised modalities after being cleared for contradictions: NMES Electrical Stimulation:  James received NMES Electrical Stimulation to elicit muscle contraction of the left quad. Pt received stimulation at a rate of 50  "pps with symmetric current, ramp of 2 seconds with 10 second on time and 20 second off time for  0 minutes. Patient tolerated treatment well without any adverse effects.     biofeedback to isolate quad contraction, decrease cocontraction, and assist with neuromuscular reeducation for 0 minutes. Exercises performed with biofeedback Including: -      Patient Education and Home Exercises       Education provided:   - review of home exercise program and current Plan of Care/rationale of treatment.    Written Home Exercises Provided: Patient instructed to cont prior HEP. Exercises were reviewed and James was able to demonstrate them prior to the end of the session.  James demonstrated good understanding of the education provided. See EMR under Patient Instructions for exercises provided during therapy sessions    Assessment     At Evaluation:  James is a 29 y.o. female referred to outpatient Physical Therapy with a medical diagnosis of left knee pain. Patient arrived in a wheelchair with complaints of left knee pain that started on Halloween. She says the knee had been feeling "off" that day and she laid down for a little while. Then she says she got up to go to the bathroom and as she went to sit down on the commode her knee "popped and crunched" and scared her as she says her kneecap has dislocated a few times. She states she was too scared to attempt to stand up by herself and began using crutches until she went to a primary care doctor on 11/11/24. She had an xray that was normal. She was referred to ortho and went on 11/14/24. An MRI has been ordered but is not scheduled until 12/9. She states that she was told at both places to not put any weight on the leg until she had her MRI but this is not found in either of their notes. She was extremely guarded and apprehensive today. At the beginning of the evaluation she would not really move the knee. She was co-shahla severely and only had about 14 degrees of " range of motion to start. Spent a lot of time trying to get patient to relax and convince her that her knee cap was not going to pop out of place if she moved her knee. After gentle passive range of motion was finally able to get knee to full extension. All of her pain complaints with special tests and range of motion were in the thigh musculature and not the knee joint itself. She had poor quad set. Kinesiotape was placed on left knee with half figure 8 on each side of patella to give quad feedback. By the end of the evaluation she was able to walk with rolling walker with CGA/SBA and was able to go up/down steps with step to pattern and bilateral handrails.        Current Assessment:  James is less apprehensive with each activity today. No adverse skin reactions noted with tape removal although reactive verbally. Able to correct gait and encouraged her to return to U crutch use  James Is progressing towards her goals.   Pt prognosis is Good.     Pt will continue to benefit from skilled outpatient physical therapy to address the deficits listed in the problem list box on initial evaluation, provide pt/family education and to maximize pt's level of independence in the home and community environment.     Pt's spiritual, cultural and educational needs considered and pt agreeable to plan of care and goals.     Anticipated barriers to physical therapy: none    SHORT TERM GOALS  1.  Patient to be independent with home exercise program to facilitate carryover between therapy visits.  2.  Patient will have 0-120 degrees range of motion left knee for improved mobility.  3.  Patient will perform straight leg raise and hold 10 seconds without quad lag for increased quad control.  4.  Patient will increase manual muscle test of left lower extremity to 4+ to 5/5 for increased stability with gait and activities of daily living.     LONG TERM GOALS  1.  Patient will go up/down stairs reciprocal pattern with handrails as needed  and good eccentric control on left lower extremity.  2.  Patient will ambulate independent without assistive device, without deviation and without pain in left knee.  3.  Patient will return to all normal daily activities.     Plan     Plan of care Certification: 11/19/2024 to 2/14/2025.     Outpatient Physical Therapy 2 times weekly for 12 weeks to include the following interventions: 45525 [therapeutic exercise], 38267 [neuromuscular re-education], 66081 [gait training], 23420 [manual therapy], 81351 [therapeutic activities], 32140 [aquatic therapy], 10669 [unattended electrical stimulation], 97607 [biofeedback by any modality], and 92240 [vasopneumatic device].     Yesenia Garcia, PTA   12/26/2024

## 2024-12-31 ENCOUNTER — CLINICAL SUPPORT (OUTPATIENT)
Dept: REHABILITATION | Facility: HOSPITAL | Age: 29
End: 2024-12-31
Payer: COMMERCIAL

## 2024-12-31 DIAGNOSIS — R26.9 GAIT DIFFICULTY: ICD-10-CM

## 2024-12-31 DIAGNOSIS — M25.562 ACUTE PAIN OF LEFT KNEE: Primary | ICD-10-CM

## 2024-12-31 DIAGNOSIS — M25.662 DECREASED ROM OF LEFT KNEE: ICD-10-CM

## 2024-12-31 DIAGNOSIS — M62.81 WEAKNESS OF LEFT QUADRICEPS MUSCLE: ICD-10-CM

## 2024-12-31 PROCEDURE — 97110 THERAPEUTIC EXERCISES: CPT | Mod: CQ

## 2024-12-31 PROCEDURE — 97530 THERAPEUTIC ACTIVITIES: CPT | Mod: CQ

## 2024-12-31 PROCEDURE — 97112 NEUROMUSCULAR REEDUCATION: CPT | Mod: CQ

## 2024-12-31 NOTE — PROGRESS NOTES
[facility]   Physical Therapy Treatment Note      Name: James Kirk  Clinic Number: [id number]    Therapy Diagnosis:   Encounter Diagnoses   Name Primary?    Acute pain of left knee Yes    Decreased ROM of left knee     Gait difficulty     Weakness of left quadriceps muscle      Physician: Shalini Hunt MD    Visit Date: 12/31/2024    Physician Orders: PT Eval and Treat   Medical Diagnosis from Referral: see above  Evaluation Date: 11/19/2024  Authorization Period Expiration: 2/17/2025  Plan of Care Expiration: 2/14/2025     Date of Surgery: n/a  Visit # / Visits authorized: 10/13     FOTO: 1/3 = 31    2/3 = 64     Precautions: Standard     PTA Visit #: 3/5     Time In: 1:05 pm  Time Out: 1:55 pm  Total Billable Time: 45 minutes    Subjective     Pt reports: she is not hurting at all today. Her knee is feeling pretty good.  She was compliant with home exercise program.  Response to previous treatment: no complaints   Functional change: not using w/c inside home    Pain: 0/10  Location: left knee/thigh    Objective      0-130 degrees range of motion left knee on dismissal     Treatment     James received the treatments listed below:      therapeutic exercises to develop strength, endurance, ROM, flexibility, posture, and core stabilization for 8 minutes including:  Bike x 5 minutes    Seated hamstring stretch   Slant board w/ quad set 10 x 10 seconds      manual therapy techniques: Joint mobilizations, Manual traction, Myofacial release, Soft tissue Mobilization, and Friction Massage were applied for 0 minutes, including:  Patellar mobs    neuromuscular re-education activities to improve: Balance, Coordination, Kinesthetic Sense, Proprioception, and Posture for 12 minutes. The following activities were included:  Standing on left leg without holding on (alternating left to right with attempting unilateral stance) x 5 minutes   Wobble board bilateral without holding onto rails  10 x 10 seconds   Long arc quad w/ ball squeeze x 20 - left     (not performed today)   Terminal knee extension w/ towel roll - 5sh x 20  Straight leg raise 2 x 10 w/out strap  SL hip adduction 5 x 3  Closed chain terminal knee extension - ball on wall - 5sh x 20  Quad set x 5 min with NMES and both verbal and tactile cueing  Straight leg raise x 5 minutes with neuromuscular electrical stimulation with strap for assist  Short arc quad x 5 minutes with neuromuscular electrical stimulation and strap    therapeutic activities to improve functional performance for  18 minutes, including:  Bilateral leg press 4 plates x 30 - ball b/t knees  Unilateral leg press 1 plate 15 x 10 second hold   Sit to stands without upper extremity assist 3 x 5   Calf raises x 20 at rail  Step up onto foam piece 3 x 5 with unilateral handrail  Standing hip abduction 2 x 10 each leg (one hand on rail)    gait training to improve functional mobility and safety for 7 minutes, including:  Gait training in parallel bars with patient occasionally grabbing but mostly ambulating without holding on.  She had poor toe off, knee flexion/swing phase, and heelstrike; BUT she was able to ambulate without assistive device without her knee giving way. Instructed her to ambulate around household without assistive device but to use one outside of household. Feel she will do better with gait once she accepts she can weightbear without incident. Will continue Plan of Care with focus on functional abilities.     direct contact modalities after being cleared for contraindications:     supervised modalities after being cleared for contradictions: NMES Electrical Stimulation:  James received NMES Electrical Stimulation to elicit muscle contraction of the left quad. Pt received stimulation at a rate of 50 pps with symmetric current, ramp of 2 seconds with 10 second on time and 20 second off time for  0 minutes. Patient tolerated treatment well without any adverse  "effects.     biofeedback to isolate quad contraction, decrease cocontraction, and assist with neuromuscular reeducation for 0 minutes. Exercises performed with biofeedback Including: -      Patient Education and Home Exercises       Education provided:   - review of home exercise program and current Plan of Care/rationale of treatment.    Written Home Exercises Provided: Patient instructed to cont prior HEP. Exercises were reviewed and James was able to demonstrate them prior to the end of the session.  James demonstrated good understanding of the education provided. See EMR under Patient Instructions for exercises provided during therapy sessions    Assessment     At Evaluation:  James is a 29 y.o. female referred to outpatient Physical Therapy with a medical diagnosis of left knee pain. Patient arrived in a wheelchair with complaints of left knee pain that started on Halloween. She says the knee had been feeling "off" that day and she laid down for a little while. Then she says she got up to go to the bathroom and as she went to sit down on the commode her knee "popped and crunched" and scared her as she says her kneecap has dislocated a few times. She states she was too scared to attempt to stand up by herself and began using crutches until she went to a primary care doctor on 11/11/24. She had an xray that was normal. She was referred to ortho and went on 11/14/24. An MRI has been ordered but is not scheduled until 12/9. She states that she was told at both places to not put any weight on the leg until she had her MRI but this is not found in either of their notes. She was extremely guarded and apprehensive today. At the beginning of the evaluation she would not really move the knee. She was co-shahla severely and only had about 14 degrees of range of motion to start. Spent a lot of time trying to get patient to relax and convince her that her knee cap was not going to pop out of place if she moved her " knee. After gentle passive range of motion was finally able to get knee to full extension. All of her pain complaints with special tests and range of motion were in the thigh musculature and not the knee joint itself. She had poor quad set. Kinesiotape was placed on left knee with half figure 8 on each side of patella to give quad feedback. By the end of the evaluation she was able to walk with rolling walker with CGA/SBA and was able to go up/down steps with step to pattern and bilateral handrails.        Current Assessment:  James was able to balance on [email or web address] board x 10 seconds with bilateral feet without holding on but could not stand on unilateral foot on either one.       James Is progressing towards her goals.   Pt prognosis is Good.     Pt will continue to benefit from skilled outpatient physical therapy to address the deficits listed in the problem list box on initial evaluation, provide pt/family education and to maximize pt's level of independence in the home and community environment.     Pt's spiritual, cultural and educational needs considered and pt agreeable to plan of care and goals.     Anticipated barriers to physical therapy: none    SHORT TERM GOALS  1.  Patient to be independent with home exercise program to facilitate carryover between therapy visits.  2.  Patient will have 0-120 degrees range of motion left knee for improved mobility.  3.  Patient will perform straight leg raise and hold 10 seconds without quad lag for increased quad control.  4.  Patient will increase manual muscle test of left lower extremity to 4+ to 5/5 for increased stability with gait and activities of daily living.     LONG TERM GOALS  1.  Patient will go up/down stairs reciprocal pattern with handrails as needed and good eccentric control on left lower extremity.  2.  Patient will ambulate independent without assistive device, without deviation and without pain in left knee.  3.  Patient will return to all normal  daily activities.     Plan     Plan of care Certification: 11/19/2024 to 2/14/2025.     Outpatient Physical Therapy 2 times weekly for 12 weeks to include the following interventions: 14102 [therapeutic exercise], 70598 [neuromuscular re-education], 66220 [gait training], 53831 [manual therapy], 27333 [therapeutic activities], 63196 [aquatic therapy], 03643 [unattended electrical stimulation], 65295 [biofeedback by any modality], and 72028 [vasopneumatic device].     Leda Jordan, PTA   12/31/2024

## 2025-01-07 ENCOUNTER — CLINICAL SUPPORT (OUTPATIENT)
Dept: REHABILITATION | Facility: HOSPITAL | Age: 30
End: 2025-01-07
Payer: COMMERCIAL

## 2025-01-07 DIAGNOSIS — M62.81 WEAKNESS OF LEFT QUADRICEPS MUSCLE: ICD-10-CM

## 2025-01-07 DIAGNOSIS — M25.662 DECREASED ROM OF LEFT KNEE: ICD-10-CM

## 2025-01-07 DIAGNOSIS — M25.562 ACUTE PAIN OF LEFT KNEE: Primary | ICD-10-CM

## 2025-01-07 DIAGNOSIS — R26.9 GAIT DIFFICULTY: ICD-10-CM

## 2025-01-07 PROCEDURE — 97530 THERAPEUTIC ACTIVITIES: CPT | Mod: CQ

## 2025-01-07 PROCEDURE — 97112 NEUROMUSCULAR REEDUCATION: CPT | Mod: CQ

## 2025-01-07 NOTE — PROGRESS NOTES
[facility]   Physical Therapy Treatment Note      Name: James Kirk  Clinic Number: [id number]    Therapy Diagnosis:   Encounter Diagnoses   Name Primary?    Acute pain of left knee Yes    Decreased ROM of left knee     Gait difficulty     Weakness of left quadriceps muscle      Physician: Shalini Hunt MD    Visit Date: 1/7/2025    Physician Orders: PT Eval and Treat   Medical Diagnosis from Referral: see above  Evaluation Date: 11/19/2024  Authorization Period Expiration: 2/17/2025  Plan of Care Expiration: 2/14/2025     Date of Surgery: n/a  Visit # / Visits authorized: 11/13     FOTO: 1/3 = 31    2/3 = 64     Precautions: Standard     PTA Visit #: 4/5     Time In: 1:02 pm  Time Out: 1:51 pm  Total Billable Time: 47 minutes    Subjective     Pt reports: she has been practicing walking around the house without crutches. She almost fell once and that scared her and made her anxious so she used both crutches for a few days.   She was compliant with home exercise program.  Response to previous treatment: no complaints   Functional change: not using w/c inside home    Pain: 0/10  Location: left knee/thigh    Objective      0-130 degrees range of motion left knee on dismissal     Treatment     James received the treatments listed below:      therapeutic exercises to develop strength, endurance, ROM, flexibility, posture, and core stabilization for 14 minutes including:  Bike x 5 minutes    Seated hamstring stretch 3 x 30 second hold   Standing hamstring stretch 3 x 30 second hold   Calf stretch 2 x 1 minute    manual therapy techniques: Joint mobilizations, Manual traction, Myofacial release, Soft tissue Mobilization, and Friction Massage were applied for 0 minutes, including:  Patellar mobs    neuromuscular re-education activities to improve: Balance, Coordination, Kinesthetic Sense, Proprioception, and Posture for 10 minutes. The following activities were included:  Single leg  "stance on left 5 x 15 second hold   Wobble board bilateral without holding onto rails 5 x 30 seconds   Sit to stand x 10  Forward step up 6" x 10    therapeutic activities to improve functional performance for  23 minutes, including:  Bilateral leg press 4 plates x 30 - 3 second hold in extension   Unilateral leg press 2 plates 30 x 3 second hold   Standing hip abduction 2 x 10 each leg (one hand on rail)  Up and down 4 steps with rails, reciprocal pattern, then one rail x 2 repetitions each   Walking around clinic for weightbearing evenly x 2 minutes   Transfer from mat to floor to mat for simulation of bath 2 x each way    gait training to improve functional mobility and safety for 0 minutes, including:      direct contact modalities after being cleared for contraindications:     supervised modalities after being cleared for contradictions: NMES Electrical Stimulation:  James received NMES Electrical Stimulation to elicit muscle contraction of the left quad. Pt received stimulation at a rate of 50 pps with symmetric current, ramp of 2 seconds with 10 second on time and 20 second off time for  0 minutes. Patient tolerated treatment well without any adverse effects.     biofeedback to isolate quad contraction, decrease cocontraction, and assist with neuromuscular reeducation for 0 minutes. Exercises performed with biofeedback Including: -      Patient Education and Home Exercises       Education provided:   - review of home exercise program and current Plan of Care/rationale of treatment.    Written Home Exercises Provided: Patient instructed to cont prior HEP. Exercises were reviewed and James was able to demonstrate them prior to the end of the session.  James demonstrated good understanding of the education provided. See EMR under Patient Instructions for exercises provided during therapy sessions    Assessment     At Evaluation:  James is a 29 y.o. female referred to outpatient Physical Therapy with a " "medical diagnosis of left knee pain. Patient arrived in a wheelchair with complaints of left knee pain that started on Halloween. She says the knee had been feeling "off" that day and she laid down for a little while. Then she says she got up to go to the bathroom and as she went to sit down on the commode her knee "popped and crunched" and scared her as she says her kneecap has dislocated a few times. She states she was too scared to attempt to stand up by herself and began using crutches until she went to a primary care doctor on 11/11/24. She had an xray that was normal. She was referred to ortho and went on 11/14/24. An MRI has been ordered but is not scheduled until 12/9. She states that she was told at both places to not put any weight on the leg until she had her MRI but this is not found in either of their notes. She was extremely guarded and apprehensive today. At the beginning of the evaluation she would not really move the knee. She was co-shahla severely and only had about 14 degrees of range of motion to start. Spent a lot of time trying to get patient to relax and convince her that her knee cap was not going to pop out of place if she moved her knee. After gentle passive range of motion was finally able to get knee to full extension. All of her pain complaints with special tests and range of motion were in the thigh musculature and not the knee joint itself. She had poor quad set. Kinesiotape was placed on left knee with half figure 8 on each side of patella to give quad feedback. By the end of the evaluation she was able to walk with rolling walker with CGA/SBA and was able to go up/down steps with step to pattern and bilateral handrails.        Current Assessment:  James arrived for PT with one crutch today. She was talked through lowering herself from the mat to the floor and back to simulate getting in/out of the bath tub. She successfully completed this twice. With balance activities, she " was able to stand on wobble board with both feet without holding on for 30 seconds at a time 5 times. She stood in front of rail and stood on left foot with right raised without holding on for up to 15 seconds 5 times, which is a vast improvement from last visit when she could not stand on either leg for 5 seconds at a time. She was able to increase weight on unilateral leg press from 1 to 2 plates without noticing. Will continue Plan of Care to get patient back to prior level of function.         James Is progressing towards her goals.   Pt prognosis is Good.     Pt will continue to benefit from skilled outpatient physical therapy to address the deficits listed in the problem list box on initial evaluation, provide pt/family education and to maximize pt's level of independence in the home and community environment.     Pt's spiritual, cultural and educational needs considered and pt agreeable to plan of care and goals.     Anticipated barriers to physical therapy: none    SHORT TERM GOALS  1.  Patient to be independent with home exercise program to facilitate carryover between therapy visits.  2.  Patient will have 0-120 degrees range of motion left knee for improved mobility.  3.  Patient will perform straight leg raise and hold 10 seconds without quad lag for increased quad control.  4.  Patient will increase manual muscle test of left lower extremity to 4+ to 5/5 for increased stability with gait and activities of daily living.     LONG TERM GOALS  1.  Patient will go up/down stairs reciprocal pattern with handrails as needed and good eccentric control on left lower extremity.  2.  Patient will ambulate independent without assistive device, without deviation and without pain in left knee.  3.  Patient will return to all normal daily activities.     Plan     Plan of care Certification: 11/19/2024 to 2/14/2025.     Outpatient Physical Therapy 2 times weekly for 12 weeks to include the following interventions:  80733 [therapeutic exercise], 28998 [neuromuscular re-education], 57284 [gait training], 13777 [manual therapy], 97662 [therapeutic activities], 02468 [aquatic therapy], 13969 [unattended electrical stimulation], 79671 [biofeedback by any modality], and 93670 [vasopneumatic device].     Leda Jordan, PTA   01/07/2025

## 2025-01-09 ENCOUNTER — CLINICAL SUPPORT (OUTPATIENT)
Dept: REHABILITATION | Facility: HOSPITAL | Age: 30
End: 2025-01-09
Payer: COMMERCIAL

## 2025-01-09 DIAGNOSIS — M25.562 ACUTE PAIN OF LEFT KNEE: Primary | ICD-10-CM

## 2025-01-09 DIAGNOSIS — M62.81 WEAKNESS OF LEFT QUADRICEPS MUSCLE: ICD-10-CM

## 2025-01-09 DIAGNOSIS — M25.662 DECREASED ROM OF LEFT KNEE: ICD-10-CM

## 2025-01-09 DIAGNOSIS — R26.9 GAIT DIFFICULTY: ICD-10-CM

## 2025-01-09 PROCEDURE — 97112 NEUROMUSCULAR REEDUCATION: CPT | Mod: CQ

## 2025-01-09 PROCEDURE — 97530 THERAPEUTIC ACTIVITIES: CPT | Mod: CQ

## 2025-01-09 NOTE — PROGRESS NOTES
"[facility]   Physical Therapy Treatment Note      Name: James Kirk  Clinic Number: [id number]    Therapy Diagnosis:   Encounter Diagnoses   Name Primary?    Acute pain of left knee Yes    Decreased ROM of left knee     Gait difficulty     Weakness of left quadriceps muscle      Physician: Shalini Hunt MD    Visit Date: 1/9/2025    Physician Orders: PT Eval and Treat   Medical Diagnosis from Referral: see above  Evaluation Date: 11/19/2024  Authorization Period Expiration: 2/17/2025  Plan of Care Expiration: 2/14/2025     Date of Surgery: n/a  Visit # / Visits authorized: 12/13     FOTO: 1/3 = 31    2/3 = 64     Precautions: Standard     PTA Visit #: 5/5     Time In: 1:02 pm  Time Out: 1:49 pm  Total Billable Time: 47 minutes    Subjective     Pt reports: she has been walking a lot without crutches. She has still been too scared to try getting in the tub. Her leg jumps some times when she is sitting down.  She was compliant with home exercise program.  Response to previous treatment: no complaints   Functional change: not using w/c inside home    Pain: 0/10  Location: left knee/thigh    Objective      0-130 degrees range of motion left knee on dismissal     Treatment     James received the treatments listed below:      therapeutic exercises to develop strength, endurance, ROM, flexibility, posture, and core stabilization for 11 minutes including:  Bike x 5 minutes    Seated hamstring stretch 3 x 30 second hold   Calf stretch 2 x 1 minute    neuromuscular re-education activities to improve: Balance, Coordination, Kinesthetic Sense, Proprioception, and Posture for 11 minutes. The following activities were included:  Single leg stance on left 5 x 10 second hold    Sit to stand x 20  Forward step up 6" x 20    therapeutic activities to improve functional performance for  25 minutes, including:  Simulated bath tub with [employer]ok step and incline bolsters to practice in and out of tub " "x 10 minutes with 3 successful transfers each way  Bilateral leg press 5 plates x 30 - 3 second hold in extension   Unilateral leg press 2 plates 30 x 3 second hold   Side stepping with red theraband 2 x 8 steps each side  Up and down 4 steps with rails, reciprocal pattern, then one rail x 5 repetitions      gait training to improve functional mobility and safety for 0 minutes, including:      Patient Education and Home Exercises       Education provided:   - review of home exercise program and current Plan of Care/rationale of treatment.    Written Home Exercises Provided: Patient instructed to cont prior HEP. Exercises were reviewed and James was able to demonstrate them prior to the end of the session.  James demonstrated good understanding of the education provided. See EMR under Patient Instructions for exercises provided during therapy sessions    Assessment     At Evaluation:  James is a 29 y.o. female referred to outpatient Physical Therapy with a medical diagnosis of left knee pain. Patient arrived in a wheelchair with complaints of left knee pain that started on Halloween. She says the knee had been feeling "off" that day and she laid down for a little while. Then she says she got up to go to the bathroom and as she went to sit down on the commode her knee "popped and crunched" and scared her as she says her kneecap has dislocated a few times. She states she was too scared to attempt to stand up by herself and began using crutches until she went to a primary care doctor on 11/11/24. She had an xray that was normal. She was referred to ortho and went on 11/14/24. An MRI has been ordered but is not scheduled until 12/9. She states that she was told at both places to not put any weight on the leg until she had her MRI but this is not found in either of their notes. She was extremely guarded and apprehensive today. At the beginning of the evaluation she would not really move the knee. She was co-shahla " severely and only had about 14 degrees of range of motion to start. Spent a lot of time trying to get patient to relax and convince her that her knee cap was not going to pop out of place if she moved her knee. After gentle passive range of motion was finally able to get knee to full extension. All of her pain complaints with special tests and range of motion were in the thigh musculature and not the knee joint itself. She had poor quad set. Kinesiotape was placed on left knee with half figure 8 on each side of patella to give quad feedback. By the end of the evaluation she was able to walk with rolling walker with CGA/SBA and was able to go up/down steps with step to pattern and bilateral handrails.        Current Assessment:  James remains too anxious to attempt getting into the tub. Set up a practice area with wedges, reebok step, and mat to simulate stepping over side of tub and lowering herself in, then getting back up and out. The first trial took some effort and several attempts before she could successfully get herself out of floor but second and third trial she did much better. She appeared confident after last trial. She worries about her leg feeling tight and shaking so she was shown quad and hamstring stretches to improve flexibility. She was concerned about her leg shaking - explained the muscle is fatiguing and if she will rest a moment, it will help. Will continue Plan of Care to get patient back to prior level of function.         James Is progressing towards her goals.   Pt prognosis is Good.     Pt will continue to benefit from skilled outpatient physical therapy to address the deficits listed in the problem list box on initial evaluation, provide pt/family education and to maximize pt's level of independence in the home and community environment.     Pt's spiritual, cultural and educational needs considered and pt agreeable to plan of care and goals.     Anticipated barriers to physical therapy:  none    SHORT TERM GOALS  1.  Patient to be independent with home exercise program to facilitate carryover between therapy visits.  2.  Patient will have 0-120 degrees range of motion left knee for improved mobility.  3.  Patient will perform straight leg raise and hold 10 seconds without quad lag for increased quad control.  4.  Patient will increase manual muscle test of left lower extremity to 4+ to 5/5 for increased stability with gait and activities of daily living.     LONG TERM GOALS  1.  Patient will go up/down stairs reciprocal pattern with handrails as needed and good eccentric control on left lower extremity.  2.  Patient will ambulate independent without assistive device, without deviation and without pain in left knee.  3.  Patient will return to all normal daily activities.     Plan     Plan of care Certification: 11/19/2024 to 2/14/2025.     Outpatient Physical Therapy 2 times weekly for 12 weeks to include the following interventions: 68929 [therapeutic exercise], 72558 [neuromuscular re-education], 13339 [gait training], 80209 [manual therapy], 00839 [therapeutic activities], 47805 [aquatic therapy], 12611 [unattended electrical stimulation], 29432 [biofeedback by any modality], and 20186 [vasopneumatic device].     Leda Jordan, PTA   01/09/2025

## 2025-01-14 ENCOUNTER — CLINICAL SUPPORT (OUTPATIENT)
Dept: REHABILITATION | Facility: HOSPITAL | Age: 30
End: 2025-01-14
Payer: COMMERCIAL

## 2025-01-14 DIAGNOSIS — M25.662 DECREASED ROM OF LEFT KNEE: ICD-10-CM

## 2025-01-14 DIAGNOSIS — M62.81 WEAKNESS OF LEFT QUADRICEPS MUSCLE: ICD-10-CM

## 2025-01-14 DIAGNOSIS — M25.562 ACUTE PAIN OF LEFT KNEE: Primary | ICD-10-CM

## 2025-01-14 DIAGNOSIS — R26.9 GAIT DIFFICULTY: ICD-10-CM

## 2025-01-14 PROCEDURE — 97110 THERAPEUTIC EXERCISES: CPT

## 2025-01-14 PROCEDURE — 97530 THERAPEUTIC ACTIVITIES: CPT

## 2025-01-14 PROCEDURE — 97112 NEUROMUSCULAR REEDUCATION: CPT

## 2025-01-14 NOTE — PROGRESS NOTES
"[facility]   Physical Therapy Treatment Note      Name: James Kirk  Clinic Number: [id number]    Therapy Diagnosis:   Encounter Diagnoses   Name Primary?    Acute pain of left knee Yes    Decreased ROM of left knee     Gait difficulty     Weakness of left quadriceps muscle      Physician: Shalini Hunt MD    Visit Date: 1/14/2025    Physician Orders: PT Eval and Treat   Medical Diagnosis from Referral: see above  Evaluation Date: 11/19/2024  Authorization Period Expiration: 2/17/2025  Plan of Care Expiration: 2/14/2025     Date of Surgery: n/a  Visit # / Visits authorized: 13/17     FOTO: 1/3 = 31    2/3 = 64    3/3 = 58     Precautions: Standard     PTA Visit #: 0/5     Time In: 1:29 pm  Time Out: 2:22 pm  Total Billable Time: 53 minutes    Subjective     Pt reports: she arrives with one crutch and no new complaints  She was compliant with home exercise program.  Response to previous treatment: no complaints   Functional change: not using w/c inside home    Pain: 0/10  Location: left knee/thigh    Objective      0-130 degrees range of motion left knee on dismissal     Treatment     James received the treatments listed below:      therapeutic exercises to develop strength, endurance, ROM, flexibility, posture, and core stabilization for 11 minutes including:  Bike x 5 minutes    Seated hamstring stretch 3 x 30 second hold - on bench - showed how to do stretch propping foot in another chair to do this at home  Calf stretch x 2 minutes    neuromuscular re-education activities to improve: Balance, Coordination, Kinesthetic Sense, Proprioception, and Posture for 25 minutes. The following activities were included:  Single leg stance on left 5 x 10 second hold - not today  Sit to stand x 20  Forward step up 6" x 20  Quad set w/ single leg stance on slant board 5sh x 10  Closed chain terminal knee extension - green 5sh 2 x 10  Cybex knee extension - 1 plate 3 x 5 - bilateral " "    therapeutic activities to improve functional performance for  17 minutes, including:  Simulated bath tub with reebok step and incline bolsters to practice in and out of tub x 10 minutes with 3 successful transfers each way  Up and down 4 steps with 1 rail, reciprocal pattern, then no rail x 5 repetitions   Lateral step downs 4" 3 x 5  Front step downs 4" 3 x 5     (Not today)  Bilateral leg press 5 plates x 30 - 3 second hold in extension   Unilateral leg press 2 plates 30 x 3 second hold   Side stepping with red theraband 2 x 8 steps each side    gait training to improve functional mobility and safety for 0 minutes, including:      Patient Education and Home Exercises       Education provided:   - review of home exercise program and current Plan of Care/rationale of treatment.    Written Home Exercises Provided: Patient instructed to cont prior HEP. Exercises were reviewed and James was able to demonstrate them prior to the end of the session.  James demonstrated good understanding of the education provided. See EMR under Patient Instructions for exercises provided during therapy sessions    Assessment     At Evaluation:  James is a 29 y.o. female referred to outpatient Physical Therapy with a medical diagnosis of left knee pain. Patient arrived in a wheelchair with complaints of left knee pain that started on Halloween. She says the knee had been feeling "off" that day and she laid down for a little while. Then she says she got up to go to the bathroom and as she went to sit down on the commode her knee "popped and crunched" and scared her as she says her kneecap has dislocated a few times. She states she was too scared to attempt to stand up by herself and began using crutches until she went to a primary care doctor on 11/11/24. She had an xray that was normal. She was referred to ortho and went on 11/14/24. An MRI has been ordered but is not scheduled until 12/9. She states that she was told at both " "places to not put any weight on the leg until she had her MRI but this is not found in either of their notes. She was extremely guarded and apprehensive today. At the beginning of the evaluation she would not really move the knee. She was co-shahla severely and only had about 14 degrees of range of motion to start. Spent a lot of time trying to get patient to relax and convince her that her knee cap was not going to pop out of place if she moved her knee. After gentle passive range of motion was finally able to get knee to full extension. All of her pain complaints with special tests and range of motion were in the thigh musculature and not the knee joint itself. She had poor quad set. Kinesiotape was placed on left knee with half figure 8 on each side of patella to give quad feedback. By the end of the evaluation she was able to walk with rolling walker with CGA/SBA and was able to go up/down steps with step to pattern and bilateral handrails.        Current Assessment:  James did not attempt getting into the tub at home despite working on this last visit. She continues to remain very anxious about the knee and overthinks every move she makes. She was able again to simulate getting in and out of a tub independently. Advised her to make sure someone was home that could assist her if needed before she attempts it. She was able to do lateral and front step downs with good quad control from a 4" step. She was able to walk up and down the therapy stairs 5 times without using the handrails. Added Cybex knee extension today for additional quad strengthening. Had her get into half kneeling on each lower extremity and work on getting back up. She had a much more difficult time kneeling on left lower extremity. She continues to arrive with one crutch but she does not use it in the rehab gym. She is still ambulating with quad avoidance gait so we worked a lot on terminal knee extension in weight bearing today as well. " Overall she has made excellent improvement in her functional mobility.  Will continue Plan of Care to get patient back to prior level of function.         James Is progressing towards her goals.   Pt prognosis is Good.     Pt will continue to benefit from skilled outpatient physical therapy to address the deficits listed in the problem list box on initial evaluation, provide pt/family education and to maximize pt's level of independence in the home and community environment.     Pt's spiritual, cultural and educational needs considered and pt agreeable to plan of care and goals.     Anticipated barriers to physical therapy: none    SHORT TERM GOALS  1.  Patient to be independent with home exercise program to facilitate carryover between therapy visits.  2.  Patient will have 0-120 degrees range of motion left knee for improved mobility.  3.  Patient will perform straight leg raise and hold 10 seconds without quad lag for increased quad control.  4.  Patient will increase manual muscle test of left lower extremity to 4+ to 5/5 for increased stability with gait and activities of daily living.     LONG TERM GOALS  1.  Patient will go up/down stairs reciprocal pattern with handrails as needed and good eccentric control on left lower extremity.  2.  Patient will ambulate independent without assistive device, without deviation and without pain in left knee.  3.  Patient will return to all normal daily activities.     Plan     Plan of care Certification: 11/19/2024 to 2/14/2025.     Outpatient Physical Therapy 2 times weekly for 12 weeks to include the following interventions: 28996 [therapeutic exercise], 62184 [neuromuscular re-education], 91987 [gait training], 37118 [manual therapy], 67543 [therapeutic activities], 22904 [aquatic therapy], 47684 [unattended electrical stimulation], 51122 [biofeedback by any modality], and 77818 [vasopneumatic device].     TED PIPER, PT   01/14/2025

## 2025-01-16 ENCOUNTER — CLINICAL SUPPORT (OUTPATIENT)
Dept: REHABILITATION | Facility: HOSPITAL | Age: 30
End: 2025-01-16
Payer: COMMERCIAL

## 2025-01-16 DIAGNOSIS — M25.562 ACUTE PAIN OF LEFT KNEE: Primary | ICD-10-CM

## 2025-01-16 DIAGNOSIS — R26.9 GAIT DIFFICULTY: ICD-10-CM

## 2025-01-16 DIAGNOSIS — M25.662 DECREASED ROM OF LEFT KNEE: ICD-10-CM

## 2025-01-16 DIAGNOSIS — M62.81 WEAKNESS OF LEFT QUADRICEPS MUSCLE: ICD-10-CM

## 2025-01-16 PROCEDURE — 97530 THERAPEUTIC ACTIVITIES: CPT

## 2025-01-16 PROCEDURE — 97112 NEUROMUSCULAR REEDUCATION: CPT

## 2025-01-16 NOTE — PROGRESS NOTES
"[facility]   Physical Therapy Treatment Note      Name: James Kirk  Clinic Number: [id number]    Therapy Diagnosis:   Encounter Diagnoses   Name Primary?    Acute pain of left knee Yes    Decreased ROM of left knee     Gait difficulty     Weakness of left quadriceps muscle      Physician: Shalini Hunt MD    Visit Date: 1/16/2025    Physician Orders: PT Eval and Treat   Medical Diagnosis from Referral: see above  Evaluation Date: 11/19/2024  Authorization Period Expiration: 2/17/2025  Plan of Care Expiration: 2/14/2025     Date of Surgery: n/a  Visit # / Visits authorized: 14/17     FOTO: 1/3 = 31    2/3 = 64    3/3 = 58     Precautions: Standard     PTA Visit #: 0/5     Time In: 1:03 pm  Time Out: 1:49 pm  Total Billable Time: 23 billable minutes    Subjective     Pt reports: she still arrives with one crutch - says she is going to try not to bring it next week  She was compliant with home exercise program.  Response to previous treatment: no complaints   Functional change: not using crutch inside home most of the time    Pain: 0/10  Location: left knee/thigh    Objective      0-130 degrees range of motion left knee on dismissal     Treatment     James received the treatments listed below:      therapeutic exercises to develop strength, endurance, ROM, flexibility, posture, and core stabilization for 4 billable minutes including:  Bike x 5 minutes    Seated hamstring stretch 3 x 30 second hold - on bench - showed how to do stretch propping foot in another chair to do this at home  Calf stretch x 2 minutes  Seated hamstring curls 3 plates - 3 x 5    neuromuscular re-education activities to improve: Balance, Coordination, Kinesthetic Sense, Proprioception, and Posture for 7 billable minutes. The following activities were included:  Single leg stance on left 5 x 10 second hold - not today  Sit to stand x 20 - not today  Forward step up w/ terminal knee extension - green 6" - " "5sh x 20  Quad set w/ single leg stance on slant board 10sh x 10  Closed chain terminal knee extension - green - not today  Cybex knee extension - 1 plate 3 x 5 - bilateral     therapeutic activities to improve functional performance for  12 billable minutes, including:  Simulated bath tub with reebok step and incline bolsters to practice in and out of tub x 10 minutes with 3 successful transfers each way - not today  Up and down 4 steps with 1 rail, reciprocal pattern, then no rail x 5 repetitions - not today   Lateral step downs 6" 3 x 5  Front step downs 6" 3 x 5   Lateral step overs over 4 hurdles x 5 each direction    (Not today)  Bilateral leg press 5 plates x 30 - 3 second hold in extension   Unilateral leg press 2 plates 30 x 3 second hold   Side stepping with red theraband 2 x 8 steps each side    gait training to improve functional mobility and safety for 0 minutes, including:      Patient Education and Home Exercises       Education provided:   - review of home exercise program and current Plan of Care/rationale of treatment.    Written Home Exercises Provided: Patient instructed to cont prior HEP. Exercises were reviewed and James was able to demonstrate them prior to the end of the session.  James demonstrated good understanding of the education provided. See EMR under Patient Instructions for exercises provided during therapy sessions    Assessment     At Evaluation:  James is a 29 y.o. female referred to outpatient Physical Therapy with a medical diagnosis of left knee pain. Patient arrived in a wheelchair with complaints of left knee pain that started on Halloween. She says the knee had been feeling "off" that day and she laid down for a little while. Then she says she got up to go to the bathroom and as she went to sit down on the commode her knee "popped and crunched" and scared her as she says her kneecap has dislocated a few times. She states she was too scared to attempt to stand up by " "herself and began using crutches until she went to a primary care doctor on 11/11/24. She had an xray that was normal. She was referred to ortho and went on 11/14/24. An MRI has been ordered but is not scheduled until 12/9. She states that she was told at both places to not put any weight on the leg until she had her MRI but this is not found in either of their notes. She was extremely guarded and apprehensive today. At the beginning of the evaluation she would not really move the knee. She was co-shahla severely and only had about 14 degrees of range of motion to start. Spent a lot of time trying to get patient to relax and convince her that her knee cap was not going to pop out of place if she moved her knee. After gentle passive range of motion was finally able to get knee to full extension. All of her pain complaints with special tests and range of motion were in the thigh musculature and not the knee joint itself. She had poor quad set. Kinesiotape was placed on left knee with half figure 8 on each side of patella to give quad feedback. By the end of the evaluation she was able to walk with rolling walker with CGA/SBA and was able to go up/down steps with step to pattern and bilateral handrails.        Current Assessment:  James arrived with her crutch again today but says she is going to try not to bring it next time. She does not use it in clinic. She was able to do lateral and front step downs with good quad control from a 6" step today which was increased from last visit. She was able to do lateral step overs over 4 hurdles without any assistance or LOB.  Added Cybex hamstring curls today without difficulty. She continues to arrive with one crutch but she does not use it in the rehab gym. She is still ambulating with quad avoidance gait. We combined step up and closed chain terminal knee extension today into one activity to improve functional terminal knee extension. She did well with this. Continue " to encourage patient to be confident in her knee. Overall she has made excellent improvement in her functional mobility.  Will continue Plan of Care to get patient back to prior level of function.         James Is progressing towards her goals.   Pt prognosis is Good.     Pt will continue to benefit from skilled outpatient physical therapy to address the deficits listed in the problem list box on initial evaluation, provide pt/family education and to maximize pt's level of independence in the home and community environment.     Pt's spiritual, cultural and educational needs considered and pt agreeable to plan of care and goals.     Anticipated barriers to physical therapy: none    SHORT TERM GOALS  1.  Patient to be independent with home exercise program to facilitate carryover between therapy visits.  2.  Patient will have 0-120 degrees range of motion left knee for improved mobility.  3.  Patient will perform straight leg raise and hold 10 seconds without quad lag for increased quad control.  4.  Patient will increase manual muscle test of left lower extremity to 4+ to 5/5 for increased stability with gait and activities of daily living.     LONG TERM GOALS  1.  Patient will go up/down stairs reciprocal pattern with handrails as needed and good eccentric control on left lower extremity.  2.  Patient will ambulate independent without assistive device, without deviation and without pain in left knee.  3.  Patient will return to all normal daily activities.     Plan     Plan of care Certification: 11/19/2024 to 2/14/2025.     Outpatient Physical Therapy 2 times weekly for 12 weeks to include the following interventions: 58940 [therapeutic exercise], 54041 [neuromuscular re-education], 41045 [gait training], 14225 [manual therapy], 95051 [therapeutic activities], 75907 [aquatic therapy], 99901 [unattended electrical stimulation], 14540 [biofeedback by any modality], and 85373 [vasopneumatic device].     TED MENDEZ  FELIZ, PT   01/16/2025

## 2025-01-22 ENCOUNTER — CLINICAL SUPPORT (OUTPATIENT)
Dept: REHABILITATION | Facility: HOSPITAL | Age: 30
End: 2025-01-22
Payer: COMMERCIAL

## 2025-01-22 DIAGNOSIS — M25.662 DECREASED ROM OF LEFT KNEE: ICD-10-CM

## 2025-01-22 DIAGNOSIS — M62.81 WEAKNESS OF LEFT QUADRICEPS MUSCLE: ICD-10-CM

## 2025-01-22 DIAGNOSIS — M25.562 ACUTE PAIN OF LEFT KNEE: Primary | ICD-10-CM

## 2025-01-22 DIAGNOSIS — R26.9 GAIT DIFFICULTY: ICD-10-CM

## 2025-01-22 PROCEDURE — 97112 NEUROMUSCULAR REEDUCATION: CPT | Mod: CQ

## 2025-01-22 PROCEDURE — 97110 THERAPEUTIC EXERCISES: CPT | Mod: CQ

## 2025-01-22 PROCEDURE — 97530 THERAPEUTIC ACTIVITIES: CPT | Mod: CQ

## 2025-01-22 NOTE — PROGRESS NOTES
"[facility]   Physical Therapy Treatment Note      Name: James Kirk  Clinic Number: [id number]    Therapy Diagnosis:   No diagnosis found.    Physician: Shalini Hunt MD    Visit Date: 1/22/2025    Physician Orders: PT Eval and Treat   Medical Diagnosis from Referral: see above  Evaluation Date: 11/19/2024  Authorization Period Expiration: 2/17/2025  Plan of Care Expiration: 2/14/2025     Date of Surgery: n/a  Visit # / Visits authorized: 15/17     FOTO: 1/3 = 31    2/3 = 64    3/3 = 58     Precautions: Standard     PTA Visit #: 1/5     Time In: 1:03 pm  Time Out: 1:50 pm  Total Billable Time: 47 billable minutes    Subjective     Pt reports: she still arrives with one crutch - says she is doing fine  She was compliant with home exercise program.  Response to previous treatment: no complaints   Functional change: not using crutch inside home most of the time    Pain: 0/10  Location: left knee/thigh    Objective      0-130 degrees range of motion left knee on dismissal     Treatment     James received the treatments listed below:      therapeutic exercises to develop strength, endurance, ROM, flexibility, posture, and core stabilization for 15 billable minutes including:  Bike x 5 minutes    Seated hamstring stretch 3 x 30 second hold - on bench - showed how to do stretch propping foot in another chair to do this at home  Calf stretch x 2 minutes  Seated hamstring curls 3 plates - 4 x 5    neuromuscular re-education activities to improve: Balance, Coordination, Kinesthetic Sense, Proprioception, and Posture for 10 billable minutes. The following activities were included:  Forward step up w/ terminal knee extension - green 6" - 5sh x 20  Quad set w/ single leg stance on slant board 10sh x 10  Closed chain terminal knee extension - green - not today  Cybex knee extension - 1 plate 4 x 5 - bilateral     therapeutic activities to improve functional performance for  22 billable " "minutes, including:  Lateral step downs 6" 4 x 5  Front step downs 6" 4 x 5   Lateral step overs over 4 hurdles x 5 each direction  Wall squats x 20  Partial lunges x 10 each leg  Partial side lunges x 15 each side    (Not today)  Bilateral leg press 5 plates x 30 - 3 second hold in extension   Unilateral leg press 2 plates 30 x 3 second hold     gait training to improve functional mobility and safety for 0 minutes, including:      Patient Education and Home Exercises       Education provided:   - review of home exercise program and current Plan of Care/rationale of treatment.    Written Home Exercises Provided: Patient instructed to cont prior HEP. Exercises were reviewed and James was able to demonstrate them prior to the end of the session.  James demonstrated good understanding of the education provided. See EMR under Patient Instructions for exercises provided during therapy sessions    Assessment     At Evaluation:  James is a 29 y.o. female referred to outpatient Physical Therapy with a medical diagnosis of left knee pain. Patient arrived in a wheelchair with complaints of left knee pain that started on Halloween. She says the knee had been feeling "off" that day and she laid down for a little while. Then she says she got up to go to the bathroom and as she went to sit down on the commode her knee "popped and crunched" and scared her as she says her kneecap has dislocated a few times. She states she was too scared to attempt to stand up by herself and began using crutches until she went to a primary care doctor on 11/11/24. She had an xray that was normal. She was referred to ortho and went on 11/14/24. An MRI has been ordered but is not scheduled until 12/9. She states that she was told at both places to not put any weight on the leg until she had her MRI but this is not found in either of their notes. She was extremely guarded and apprehensive today. At the beginning of the evaluation she would not " really move the knee. She was co-shahla severely and only had about 14 degrees of range of motion to start. Spent a lot of time trying to get patient to relax and convince her that her knee cap was not going to pop out of place if she moved her knee. After gentle passive range of motion was finally able to get knee to full extension. All of her pain complaints with special tests and range of motion were in the thigh musculature and not the knee joint itself. She had poor quad set. Kinesiotape was placed on left knee with half figure 8 on each side of patella to give quad feedback. By the end of the evaluation she was able to walk with rolling walker with CGA/SBA and was able to go up/down steps with step to pattern and bilateral handrails.        Current Assessment:  James continues to arrive with one crutch but she does not use it in the rehab gym. She is still ambulating with quad avoidance gait but can correct with verbal cues. She was able to do partial lunges and side lunges, as well as wall squats. Continue to encourage patient to be confident in her knee. Overall she has made excellent improvement in her functional mobility.  Will continue Plan of Care to get patient back to prior level of function.         James Is progressing towards her goals.   Pt prognosis is Good.     Pt will continue to benefit from skilled outpatient physical therapy to address the deficits listed in the problem list box on initial evaluation, provide pt/family education and to maximize pt's level of independence in the home and community environment.     Pt's spiritual, cultural and educational needs considered and pt agreeable to plan of care and goals.     Anticipated barriers to physical therapy: none    SHORT TERM GOALS  1.  Patient to be independent with home exercise program to facilitate carryover between therapy visits.  2.  Patient will have 0-120 degrees range of motion left knee for improved mobility.  3.  Patient  will perform straight leg raise and hold 10 seconds without quad lag for increased quad control.  4.  Patient will increase manual muscle test of left lower extremity to 4+ to 5/5 for increased stability with gait and activities of daily living.     LONG TERM GOALS  1.  Patient will go up/down stairs reciprocal pattern with handrails as needed and good eccentric control on left lower extremity.  2.  Patient will ambulate independent without assistive device, without deviation and without pain in left knee.  3.  Patient will return to all normal daily activities.     Plan     Plan of care Certification: 11/19/2024 to 2/14/2025.     Outpatient Physical Therapy 2 times weekly for 12 weeks to include the following interventions: 39314 [therapeutic exercise], 16609 [neuromuscular re-education], 90911 [gait training], 18212 [manual therapy], 16663 [therapeutic activities], 88124 [aquatic therapy], 01352 [unattended electrical stimulation], 53631 [biofeedback by any modality], and 32252 [vasopneumatic device].     Leda Jordan, PTA   01/22/2025

## 2025-01-28 ENCOUNTER — CLINICAL SUPPORT (OUTPATIENT)
Dept: REHABILITATION | Facility: HOSPITAL | Age: 30
End: 2025-01-28
Payer: COMMERCIAL

## 2025-01-28 DIAGNOSIS — M25.462 PAIN AND SWELLING OF KNEE, LEFT: ICD-10-CM

## 2025-01-28 DIAGNOSIS — M25.662 DECREASED ROM OF LEFT KNEE: ICD-10-CM

## 2025-01-28 DIAGNOSIS — M62.81 WEAKNESS OF LEFT QUADRICEPS MUSCLE: Primary | ICD-10-CM

## 2025-01-28 DIAGNOSIS — S89.92XD INJURY OF LEFT KNEE, SUBSEQUENT ENCOUNTER: ICD-10-CM

## 2025-01-28 DIAGNOSIS — M25.562 PAIN AND SWELLING OF KNEE, LEFT: ICD-10-CM

## 2025-01-28 PROCEDURE — 97530 THERAPEUTIC ACTIVITIES: CPT | Mod: CQ

## 2025-01-28 PROCEDURE — 97110 THERAPEUTIC EXERCISES: CPT | Mod: CQ

## 2025-01-28 PROCEDURE — 97112 NEUROMUSCULAR REEDUCATION: CPT | Mod: CQ

## 2025-01-28 NOTE — PROGRESS NOTES
"[facility]   Physical Therapy Treatment Note      Name: James Kirk  Clinic Number: [id number]    Therapy Diagnosis:   No diagnosis found.    Physician: Shalini Hunt MD    Visit Date: 1/28/2025    Physician Orders: PT Eval and Treat   Medical Diagnosis from Referral: see above  Evaluation Date: 11/19/2024  Authorization Period Expiration: 2/17/2025  Plan of Care Expiration: 2/14/2025     Date of Surgery: n/a  Visit # / Visits authorized: 16/17     FOTO: 1/3 = 31    2/3 = 64    3/3 = 58     Precautions: Standard     PTA Visit #: 2/5     Time In: 1:02 pm  Time Out: 1:47 pm  Total Billable Time: 45 billable minutes    Subjective     Pt reports: she still arrives with one crutch - says she is walking around the house without a crutch all the time.  She was compliant with home exercise program.  Response to previous treatment: no complaints   Functional change: not using crutch inside home most of the time    Pain: 0/10  Location: left knee/thigh    Objective      0-130 degrees range of motion left knee on dismissal     Treatment     James received the treatments listed below:      therapeutic exercises to develop strength, endurance, ROM, flexibility, posture, and core stabilization for 13 billable minutes including:  Bike x 5 minutes    Seated hamstring stretch 3 x 30 second hold - on bench - showed how to do stretch propping foot in another chair to do this at home  Calf stretch x 2 minutes  Seated hamstring curls 3 plates - 6 x 5    neuromuscular re-education activities to improve: Balance, Coordination, Kinesthetic Sense, Proprioception, and Posture for 10 billable minutes. The following activities were included:  Forward step up w/ terminal knee extension - green 6" - 5sh x 20 (not performed today)   Quad set w/ single leg stance on slant board 10sh x 10  Closed chain terminal knee extension - green   Cybex knee extension - 1 plate 6 x 5 - bilateral     therapeutic " "activities to improve functional performance for  22 billable minutes, including:  Lateral step downs 6" 4 x 5  Front step downs 6" 4 x 5   Lateral step overs over 4 hurdles x 5 each direction (not performed today)   Wall squats x 30  Partial lunges x 15 each leg  Partial side lunges x 15 each side  Bilateral leg press 5 plates x 30 - 3 second hold in extension   Unilateral leg press 2 plates 30 x 3 second hold     gait training to improve functional mobility and safety for 0 minutes, including:      Patient Education and Home Exercises       Education provided:   - review of home exercise program and current Plan of Care/rationale of treatment.    Written Home Exercises Provided: Patient instructed to cont prior HEP. Exercises were reviewed and James was able to demonstrate them prior to the end of the session.  James demonstrated good understanding of the education provided. See EMR under Patient Instructions for exercises provided during therapy sessions    Assessment     At Evaluation:  James is a 29 y.o. female referred to outpatient Physical Therapy with a medical diagnosis of left knee pain. Patient arrived in a wheelchair with complaints of left knee pain that started on Halloween. She says the knee had been feeling "off" that day and she laid down for a little while. Then she says she got up to go to the bathroom and as she went to sit down on the commode her knee "popped and crunched" and scared her as she says her kneecap has dislocated a few times. She states she was too scared to attempt to stand up by herself and began using crutches until she went to a primary care doctor on 11/11/24. She had an xray that was normal. She was referred to ortho and went on 11/14/24. An MRI has been ordered but is not scheduled until 12/9. She states that she was told at both places to not put any weight on the leg until she had her MRI but this is not found in either of their notes. She was extremely guarded and " apprehensive today. At the beginning of the evaluation she would not really move the knee. She was co-shahla severely and only had about 14 degrees of range of motion to start. Spent a lot of time trying to get patient to relax and convince her that her knee cap was not going to pop out of place if she moved her knee. After gentle passive range of motion was finally able to get knee to full extension. All of her pain complaints with special tests and range of motion were in the thigh musculature and not the knee joint itself. She had poor quad set. Kinesiotape was placed on left knee with half figure 8 on each side of patella to give quad feedback. By the end of the evaluation she was able to walk with rolling walker with CGA/SBA and was able to go up/down steps with step to pattern and bilateral handrails.        Current Assessment:  James continues to arrive with one crutch, she stated that she ambulated in from car almost to check in before she needed it today. She reports her legs got very shaky and she had to have the crutch. She is still ambulating with quad avoidance gait but can correct with verbal cues. She is a  so her work depends on which assignments she chooses to accept. She is now independent with letting her dogs out, preparing her own food, walking around her home, and standing for extended periods.  Continue to encourage patient to be confident in her knee. Overall she has made excellent improvement in her functional mobility.  Will continue Plan of Care to get patient back to prior level of function.         James Is progressing towards her goals.   Pt prognosis is Good.     Pt will continue to benefit from skilled outpatient physical therapy to address the deficits listed in the problem list box on initial evaluation, provide pt/family education and to maximize pt's level of independence in the home and community environment.     Pt's spiritual, cultural and educational  needs considered and pt agreeable to plan of care and goals.     Anticipated barriers to physical therapy: none    SHORT TERM GOALS  1.  Patient to be independent with home exercise program to facilitate carryover between therapy visits.  2.  Patient will have 0-120 degrees range of motion left knee for improved mobility.  3.  Patient will perform straight leg raise and hold 10 seconds without quad lag for increased quad control.  4.  Patient will increase manual muscle test of left lower extremity to 4+ to 5/5 for increased stability with gait and activities of daily living.     LONG TERM GOALS  1.  Patient will go up/down stairs reciprocal pattern with handrails as needed and good eccentric control on left lower extremity.  2.  Patient will ambulate independent without assistive device, without deviation and without pain in left knee.  3.  Patient will return to all normal daily activities.     Plan     Plan of care Certification: 11/19/2024 to 2/14/2025.     Outpatient Physical Therapy 2 times weekly for 12 weeks to include the following interventions: 54745 [therapeutic exercise], 57448 [neuromuscular re-education], 71524 [gait training], 27249 [manual therapy], 67558 [therapeutic activities], 30001 [aquatic therapy], 92909 [unattended electrical stimulation], 10869 [biofeedback by any modality], and 75677 [vasopneumatic device].     Leda Jordan, PTA   01/28/2025

## 2025-01-30 ENCOUNTER — CLINICAL SUPPORT (OUTPATIENT)
Dept: REHABILITATION | Facility: HOSPITAL | Age: 30
End: 2025-01-30
Payer: COMMERCIAL

## 2025-01-30 DIAGNOSIS — M62.81 WEAKNESS OF LEFT QUADRICEPS MUSCLE: ICD-10-CM

## 2025-01-30 DIAGNOSIS — M25.562 ACUTE PAIN OF LEFT KNEE: Primary | ICD-10-CM

## 2025-01-30 DIAGNOSIS — M25.662 DECREASED ROM OF LEFT KNEE: ICD-10-CM

## 2025-01-30 DIAGNOSIS — R26.9 GAIT DIFFICULTY: ICD-10-CM

## 2025-01-30 PROCEDURE — 97110 THERAPEUTIC EXERCISES: CPT

## 2025-01-30 PROCEDURE — 97112 NEUROMUSCULAR REEDUCATION: CPT

## 2025-01-30 PROCEDURE — 97530 THERAPEUTIC ACTIVITIES: CPT

## 2025-01-30 NOTE — PROGRESS NOTES
"[facility]   Physical Therapy Treatment Note / Discharge Summary     Name: James Kirk  Clinic Number: [id number]    Therapy Diagnosis:   Encounter Diagnoses   Name Primary?    Acute pain of left knee Yes    Decreased ROM of left knee     Gait difficulty     Weakness of left quadriceps muscle        Physician: Shalini Hunt MD    Visit Date: 1/30/2025    Physician Orders: PT Eval and Treat   Medical Diagnosis from Referral: see above  Evaluation Date: 11/19/2024  Authorization Period Expiration: 2/17/2025  Plan of Care Expiration: 2/14/2025     Date of Surgery: n/a  Visit # / Visits authorized: 17/17     FOTO: 1/3 = 31    2/3 = 64    3/3 = 58     Precautions: Standard     PTA Visit #: 0/5     Time In: 3:15 pm  Time Out: 4:00 pm  Total Billable Time: 45 billable minutes    Subjective     Pt reports: she still arrives with one crutch - says her anxiety kicks in when her leg shakes  She was compliant with home exercise program.  Response to previous treatment: no complaints   Functional change: not using crutch inside home most of the time    Pain: 0/10  Location: left knee/thigh    Objective      0-130 degrees range of motion left knee on dismissal     Treatment     James received the treatments listed below:      therapeutic exercises to develop strength, endurance, ROM, flexibility, posture, and core stabilization for 13 billable minutes including:  Bike x 5 minutes    Seated hamstring stretch 3 x 30 second hold - on bench - showed how to do stretch propping foot in another chair to do this at home  Calf stretch x 2 minutes - not today  Seated hamstring curls 3 plates - 6 x 5    neuromuscular re-education activities to improve: Balance, Coordination, Kinesthetic Sense, Proprioception, and Posture for 10 billable minutes. The following activities were included:  Forward step up w/ terminal knee extension - green 6" - 5sh x 20 (not performed today)   Quad set w/ single leg " "stance on slant board 10sh x 10  Closed chain terminal knee extension - green   Cybex knee extension - 1 plate 6 x 5 - bilateral     therapeutic activities to improve functional performance for  22 billable minutes, including:  Lateral step downs 6" 4 x 5  Front step downs 6" 4 x 5   Lateral step overs over 4 hurdles x 5 each direction (not performed today)   Wall squats x 30  Partial lunges x 15 each leg  Partial side lunges x 15 each side  Bilateral leg press 5 plates x 30 - 3 second hold in extension   Unilateral leg press 2 plates 30 x 3 second hold     gait training to improve functional mobility and safety for 0 minutes, including:      Patient Education and Home Exercises       Education provided:   - review of home exercise program and current Plan of Care/rationale of treatment.    Written Home Exercises Provided: Patient instructed to cont prior HEP. Exercises were reviewed and James was able to demonstrate them prior to the end of the session.  James demonstrated good understanding of the education provided. See EMR under Patient Instructions for exercises provided during therapy sessions    Assessment     At Evaluation:  James is a 29 y.o. female referred to outpatient Physical Therapy with a medical diagnosis of left knee pain. Patient arrived in a wheelchair with complaints of left knee pain that started on Halloween. She says the knee had been feeling "off" that day and she laid down for a little while. Then she says she got up to go to the bathroom and as she went to sit down on the commode her knee "popped and crunched" and scared her as she says her kneecap has dislocated a few times. She states she was too scared to attempt to stand up by herself and began using crutches until she went to a primary care doctor on 11/11/24. She had an xray that was normal. She was referred to ortho and went on 11/14/24. An MRI has been ordered but is not scheduled until 12/9. She states that she was told at both " "places to not put any weight on the leg until she had her MRI but this is not found in either of their notes. She was extremely guarded and apprehensive today. At the beginning of the evaluation she would not really move the knee. She was co-shahla severely and only had about 14 degrees of range of motion to start. Spent a lot of time trying to get patient to relax and convince her that her knee cap was not going to pop out of place if she moved her knee. After gentle passive range of motion was finally able to get knee to full extension. All of her pain complaints with special tests and range of motion were in the thigh musculature and not the knee joint itself. She had poor quad set. Kinesiotape was placed on left knee with half figure 8 on each side of patella to give quad feedback. By the end of the evaluation she was able to walk with rolling walker with CGA/SBA and was able to go up/down steps with step to pattern and bilateral handrails.        Current Assessment:  James continues to arrive with one crutch, she stated that her anxiety kicks in when her leg "shakes".  Today is her last visit. She is still ambulating with quad avoidance gait but can correct with verbal cues. She is a  so her work depends on which assignments she chooses to accept. She is now independent with letting her dogs out, preparing her own food, walking around her home, and standing for extended periods.  Continue to encourage patient to be confident in her knee. Overall she has made excellent improvement in her functional mobility.  Have reached out to her PCP about her anxiety. Her home exercise program was updated. Will discharge today.         James Is progressing towards her goals.   Pt prognosis is Good.     Pt will continue to benefit from skilled outpatient physical therapy to address the deficits listed in the problem list box on initial evaluation, provide pt/family education and to maximize pt's level " of independence in the home and community environment.     Pt's spiritual, cultural and educational needs considered and pt agreeable to plan of care and goals.     Anticipated barriers to physical therapy: none    SHORT TERM GOALS  1.  Patient to be independent with home exercise program to facilitate carryover between therapy visits. MET  2.  Patient will have 0-120 degrees range of motion left knee for improved mobility. MET  3.  Patient will perform straight leg raise and hold 10 seconds without quad lag for increased quad control. MET  4.  Patient will increase manual muscle test of left lower extremity to 4+ to 5/5 for increased stability with gait and activities of daily living. MET     LONG TERM GOALS  1.  Patient will go up/down stairs reciprocal pattern with handrails as needed and good eccentric control on left lower extremity. MET  2.  Patient will ambulate independent without assistive device, without deviation and without pain in left knee. Still using 1 crutch at times but she does not need it - still ambulates with some quad avoidance due to fear  3.  Patient will return to all normal daily activities. Has resumed most of her normal activities at this point    Plan     Discharge from physical therapy to home exercise program at this time with max rehab potential met.    TED PIPER, PT   01/30/2025

## 2025-02-10 PROBLEM — M25.562 ACUTE PAIN OF LEFT KNEE: Status: RESOLVED | Noted: 2024-11-25 | Resolved: 2025-02-10

## 2025-02-10 PROBLEM — R26.9 GAIT DIFFICULTY: Status: RESOLVED | Noted: 2024-11-25 | Resolved: 2025-02-10

## 2025-02-10 PROBLEM — M25.662 DECREASED ROM OF LEFT KNEE: Status: RESOLVED | Noted: 2024-11-25 | Resolved: 2025-02-10

## 2025-02-10 PROBLEM — M62.81 WEAKNESS OF LEFT QUADRICEPS MUSCLE: Status: RESOLVED | Noted: 2024-11-25 | Resolved: 2025-02-10

## 2025-02-20 ENCOUNTER — RESULTS FOLLOW-UP (OUTPATIENT)
Dept: FAMILY MEDICINE | Facility: CLINIC | Age: 30
End: 2025-02-20
Payer: COMMERCIAL

## 2025-02-20 ENCOUNTER — OFFICE VISIT (OUTPATIENT)
Dept: FAMILY MEDICINE | Facility: CLINIC | Age: 30
End: 2025-02-20
Payer: COMMERCIAL

## 2025-02-20 VITALS
DIASTOLIC BLOOD PRESSURE: 69 MMHG | BODY MASS INDEX: 22.82 KG/M2 | OXYGEN SATURATION: 99 % | RESPIRATION RATE: 18 BRPM | WEIGHT: 124 LBS | TEMPERATURE: 98 F | HEART RATE: 85 BPM | HEIGHT: 62 IN | SYSTOLIC BLOOD PRESSURE: 111 MMHG

## 2025-02-20 DIAGNOSIS — M22.42 CHONDROMALACIA OF PATELLOFEMORAL JOINT, LEFT: ICD-10-CM

## 2025-02-20 DIAGNOSIS — Z71.6 ENCOUNTER FOR SMOKING CESSATION COUNSELING: ICD-10-CM

## 2025-02-20 DIAGNOSIS — F41.9 ANXIETY: Primary | ICD-10-CM

## 2025-02-20 PROBLEM — Z13.9 SCREENING DUE: Status: RESOLVED | Noted: 2024-12-19 | Resolved: 2025-02-20

## 2025-02-20 PROBLEM — M25.562 LEFT KNEE PAIN: Status: RESOLVED | Noted: 2024-12-19 | Resolved: 2025-02-20

## 2025-02-20 RX ORDER — IBUPROFEN 200 MG
1 TABLET ORAL DAILY
Qty: 30 PATCH | Refills: 1 | Status: SHIPPED | OUTPATIENT
Start: 2025-02-20 | End: 2025-04-03

## 2025-02-20 RX ORDER — NICOTINE 7MG/24HR
1 PATCH, TRANSDERMAL 24 HOURS TRANSDERMAL DAILY
Qty: 14 PATCH | Refills: 0 | Status: SHIPPED | OUTPATIENT
Start: 2025-04-03 | End: 2025-04-17

## 2025-02-20 RX ORDER — HYDROXYZINE PAMOATE 25 MG/1
25 CAPSULE ORAL EVERY 6 HOURS PRN
Qty: 45 CAPSULE | Refills: 0 | Status: SHIPPED | OUTPATIENT
Start: 2025-02-20

## 2025-02-20 RX ORDER — VARENICLINE TARTRATE 0.5 (11)-1
KIT ORAL
Qty: 1 EACH | Refills: 0 | Status: SHIPPED | OUTPATIENT
Start: 2025-02-20 | End: 2025-05-20

## 2025-02-20 NOTE — PROGRESS NOTES
Subjective:       Patient ID: James Kirk is a 29 y.o. female.    Chief Complaint: Follow-up (X2 months/States that she has tremors when she gets panicked and they radiate down to her legs that causes her to have to sit down, has some sharp pains when she bends her legs while standing. )    30 yo F daily nicotine vaper with a PMH of anxiety/depression, left knee injury, chronic left knee pain, and patellofemoral chondromalacia who presents to [facility] clinic with left knee pain 2-month followup. Patient had an injury with left knee popping around Halloween time in 2024 and had been having pain and muscle spasms. XR left knee was unremarkable. MRI on 11/11/24 showed lateral patella tilt and patellofemoral chondromalacia. Patient received steroid injection at orthopedics office and was recommended to have PT and followup as needed.    Left knee swelling has resolved and pain has significantly improved. She would get pain intermittently with movement but is relieved with OTC tylenol/ibuprofen. Denies f/c/cp/sob/n/v/d. Denies appetite, urinary, or bowel habit changes. She finished her PT sessions and was told her left knee has full mobility and was she was able to do 20 squats without difficutly. She was on crutches last visit but didn't need them today. She endorses anxiety and would have episodes of nervousness, knee shakes when she walks or showers at times. In the last sessions of PT they witnessed the knee shakes but told patient it's likely mental rather than physical. Patient is interested in quitting vape.        Current Medications[1]    Review of patient's allergies indicates:  No Known Allergies    History reviewed. No pertinent past medical history.    History reviewed. No pertinent surgical history.    History reviewed. No pertinent family history.    Social History[2]    Review of Systems   Constitutional:  Negative for chills, diaphoresis, fatigue and fever.   HENT:  Negative for trouble swallowing.   "  Eyes:  Negative for visual disturbance.   Respiratory:  Negative for cough and shortness of breath.    Cardiovascular:  Negative for chest pain.   Gastrointestinal:  Negative for abdominal pain, diarrhea, nausea and vomiting.   Endocrine: Negative for polyuria.   Genitourinary:  Negative for bladder incontinence, difficulty urinating and dysuria.   Musculoskeletal:  Positive for arthralgias. Negative for joint deformity.   Integumentary:  Negative for wound.   Neurological:  Negative for syncope and headaches.   Psychiatric/Behavioral:  Negative for sleep disturbance. The patient is nervous/anxious.          Current Medications:   Medication List with Changes/Refills   New Medications    HYDROXYZINE PAMOATE (VISTARIL) 25 MG CAP    Take 1 capsule (25 mg total) by mouth every 6 (six) hours as needed (anxiety).       Start Date: 2/20/2025 End Date: --    NICOTINE (NICODERM CQ) 14 MG/24 HR    Place 1 patch onto the skin once daily.       Start Date: 2/20/2025 End Date: 4/3/2025    NICOTINE (NICODERM CQ) 7 MG/24 HR    Place 1 patch onto the skin once daily. for 14 days       Start Date: 4/3/2025  End Date: 4/17/2025    VARENICLINE TARTRATE (CHANTIX STARTING MONTH BOX) 0.5 MG (11)- 1 MG (42) TABLET    Take one 0.5mg tab by mouth once daily X3 days,then increase to one 0.5mg tab twice daily X4 days,then increase to one 1mg tab twice daily       Start Date: 2/20/2025 End Date: 5/20/2025            Objective:        Vitals:    02/20/25 1105   BP: 111/69   BP Location: Left arm   Patient Position: Sitting   Pulse: 85   Resp: 18   Temp: 98.4 °F (36.9 °C)   TempSrc: Oral   SpO2: 99%   Weight: 56.2 kg (124 lb)   Height: 5' 2" (1.575 m)       Physical Exam  Vitals and nursing note reviewed. Exam conducted with a chaperone present.   Constitutional:       General: She is not in acute distress.     Appearance: She is not ill-appearing, toxic-appearing or diaphoretic.   HENT:      Head: Normocephalic and atraumatic.      Right " Ear: External ear normal.      Left Ear: External ear normal.      Nose: Nose normal.   Eyes:      General: No scleral icterus.        Right eye: No discharge.         Left eye: No discharge.   Cardiovascular:      Rate and Rhythm: Normal rate and regular rhythm.      Pulses: Normal pulses.      Heart sounds: Normal heart sounds. No murmur heard.  Pulmonary:      Effort: Pulmonary effort is normal. No respiratory distress.      Breath sounds: Normal breath sounds. No wheezing.   Abdominal:      General: There is no distension.   Musculoskeletal:         General: No swelling, tenderness or deformity.      Cervical back: Neck supple.      Right lower leg: No edema.      Left lower leg: No edema.   Skin:     General: Skin is warm and dry.      Coloration: Skin is not jaundiced.      Findings: No lesion.   Neurological:      Mental Status: She is alert.      Gait: Gait normal.   Psychiatric:         Mood and Affect: Mood is anxious.         Behavior: Behavior normal.               Lab Results   Component Value Date    WBC 6.43 11/11/2024    HGB 14.7 11/11/2024    HCT 44.7 11/11/2024     11/11/2024    CHOL 177 12/19/2024    TRIG 69 12/19/2024    HDL 65 (H) 12/19/2024    ALT 26 12/19/2024    AST 21 12/19/2024     12/19/2024    K 4.3 12/19/2024     12/19/2024    CREATININE 0.69 12/19/2024    BUN 10 12/19/2024    CO2 25 12/19/2024    TSH 3.060 11/11/2024      Assessment:       1. Anxiety    2. Chondromalacia of patellofemoral joint, left    3. Encounter for smoking cessation counseling        Plan:         Problem List Items Addressed This Visit          Psychiatric    Anxiety - Primary    Monitor  Anxiety, episodes of nervousness, knee shakes when she walks or showers at times. In the last sessions of PT they witnessed the left knee shakes but told patient it's likely mental rather than physical.     Evaluate  Mild anxiety on exam. Otherwise unremarkable exam - see PE  GAD7 = 10 pt - moderate  anxiety    Assess  Moderate anxiety    Treat  Pt took Lexapro a few years ago but reported SI/HI and those ideations resolved after stopping it.  Trial of vistaril 25mg q6h prn  Consider starting Zoloft vs other SSRI/SNRI at 2-week f/u if no relief, consider psych or refer to CBT if needed         Relevant Medications    hydrOXYzine pamoate (VISTARIL) 25 MG Cap       Orthopedic    Chondromalacia of patellofemoral joint, left    Resolved after PT and NSAID/tylenol            Other    Encounter for smoking cessation counseling    Interested in quitting vape and wants medication and nicotine patches.  Chantix (for up to 12 weeks per uptodate) and nicotine patches for 8 weeks  F/u 2 weeks         Relevant Medications    varenicline tartrate (CHANTIX STARTING MONTH BOX) 0.5 mg (11)- 1 mg (42) tablet    nicotine (NICODERM CQ) 14 mg/24 hr    nicotine (NICODERM CQ) 7 mg/24 hr (Start on 4/3/2025)         Follow up in about 2 weeks (around 3/6/2025) for anxiety.    Arsenio Mtz DO     Instructed patient that if symptoms fail to improve or worsen patient should seek immediate medical attention or report to the nearest emergency department. Patient expressed verbal agreement and understanding to this plan of care.            [1]   Current Outpatient Medications:     hydrOXYzine pamoate (VISTARIL) 25 MG Cap, Take 1 capsule (25 mg total) by mouth every 6 (six) hours as needed (anxiety)., Disp: 45 capsule, Rfl: 0    nicotine (NICODERM CQ) 14 mg/24 hr, Place 1 patch onto the skin once daily., Disp: 30 patch, Rfl: 1    [START ON 4/3/2025] nicotine (NICODERM CQ) 7 mg/24 hr, Place 1 patch onto the skin once daily. for 14 days, Disp: 14 patch, Rfl: 0    varenicline tartrate (CHANTIX STARTING MONTH BOX) 0.5 mg (11)- 1 mg (42) tablet, Take one 0.5mg tab by mouth once daily X3 days,then increase to one 0.5mg tab twice daily X4 days,then increase to one 1mg tab twice daily, Disp: 1 each, Rfl: 0  [2]   Social History  Tobacco Use     Smoking status: Every Day     Types: Vaping with nicotine    Smokeless tobacco: Never   Substance Use Topics    Alcohol use: Not Currently    Drug use: Never

## 2025-02-21 NOTE — ASSESSMENT & PLAN NOTE
Monitor  Anxiety, episodes of nervousness, knee shakes when she walks or showers at times. In the last sessions of PT they witnessed the left knee shakes but told patient it's likely mental rather than physical.     Evaluate  Mild anxiety on exam. Otherwise unremarkable exam - see PE  GAD7 = 10 pt - moderate anxiety    Assess  Moderate anxiety    Treat  Pt took Lexapro a few years ago but reported SI/HI and those ideations resolved after stopping it.  Trial of vistaril 25mg q6h prn  Consider starting Zoloft vs other SSRI/SNRI at 2-week f/u if no relief, consider psych or refer to CBT if needed

## 2025-02-21 NOTE — PROGRESS NOTES
Previous CMP, lipid panel wnl. HIV and Hep C negative. All results discussed with pt during visit. All questions answered.

## 2025-02-21 NOTE — ASSESSMENT & PLAN NOTE
Interested in quitting vape and wants medication and nicotine patches.  Chantix (for up to 12 weeks per uptodate) and nicotine patches for 8 weeks  F/u 2 weeks

## 2025-03-06 ENCOUNTER — OFFICE VISIT (OUTPATIENT)
Dept: FAMILY MEDICINE | Facility: CLINIC | Age: 30
End: 2025-03-06
Payer: COMMERCIAL

## 2025-03-06 VITALS
BODY MASS INDEX: 23.55 KG/M2 | TEMPERATURE: 98 F | WEIGHT: 128 LBS | HEIGHT: 62 IN | RESPIRATION RATE: 18 BRPM | DIASTOLIC BLOOD PRESSURE: 69 MMHG | SYSTOLIC BLOOD PRESSURE: 104 MMHG | HEART RATE: 88 BPM | OXYGEN SATURATION: 98 %

## 2025-03-06 DIAGNOSIS — F41.1 GAD (GENERALIZED ANXIETY DISORDER): Primary | ICD-10-CM

## 2025-03-06 PROBLEM — F41.9 ANXIETY: Status: RESOLVED | Noted: 2025-02-20 | Resolved: 2025-03-06

## 2025-03-06 RX ORDER — SERTRALINE HYDROCHLORIDE 25 MG/1
25 TABLET, FILM COATED ORAL DAILY
Qty: 30 TABLET | Refills: 0 | Status: SHIPPED | OUTPATIENT
Start: 2025-03-06 | End: 2025-04-05

## 2025-03-06 NOTE — PROGRESS NOTES
Subjective:       Patient ID: James Kirk is a 29 y.o. female.    Chief Complaint: Follow-up (X2 weeks /Wants to talk about a medication adjustment for her anxiety medications )    The patient reports that the anxiety medication initially made her very sleepy, but the sleepiness resolved after the fourth day. The medication helped take the edge off her anxiety, particularly in social situations, but did not alleviate fears associated with specific triggers such as storms and showers. Previous trials with Vistaril were only temporarily effective, and Lexapro exacerbated depressive symptoms. The patient has not tried Zoloft before and agrees to start it at a low dose. Cognitive Behavioral Therapy (CBT) was also discussed as a supplementary treatment.    -------------------------------------  Depression      Comment:  Lexapro caused SI/HI - diagnosed for years  JUDITH (generalized anxiety disorder)      Comment:  GAD7 = 10pt - moderate anxiety            Current Medications[1]    Review of patient's allergies indicates:  No Known Allergies    Past Medical History:   Diagnosis Date    Depression     Lexapro caused SI/HI - diagnosed for years    JUDITH (generalized anxiety disorder)     GAD7 = 10pt - moderate anxiety       No past surgical history on file.    No family history on file.    Social History[2]    Review of Systems   Constitutional:  Negative for chills, diaphoresis, fatigue and fever.   HENT:  Negative for trouble swallowing.    Eyes:  Negative for visual disturbance.   Respiratory:  Negative for cough and shortness of breath.    Cardiovascular:  Negative for chest pain.   Gastrointestinal:  Negative for abdominal pain, diarrhea, nausea and vomiting.   Endocrine: Negative for polyuria.   Genitourinary:  Negative for bladder incontinence, difficulty urinating and dysuria.   Musculoskeletal:  Negative for joint deformity.   Integumentary:  Negative for wound.   Neurological:  Negative for syncope and headaches.  "  Psychiatric/Behavioral:  Positive for depressed mood. Negative for self-injury, sleep disturbance and suicidal ideas. The patient is nervous/anxious.          Current Medications:   Medication List with Changes/Refills   New Medications    SERTRALINE (ZOLOFT) 25 MG TABLET    Take 1 tablet (25 mg total) by mouth once daily.       Start Date: 3/6/2025  End Date: 4/5/2025   Current Medications    HYDROXYZINE PAMOATE (VISTARIL) 25 MG CAP    Take 1 capsule (25 mg total) by mouth every 6 (six) hours as needed (anxiety).       Start Date: 2/20/2025 End Date: --    NICOTINE (NICODERM CQ) 14 MG/24 HR    Place 1 patch onto the skin once daily.       Start Date: 2/20/2025 End Date: 4/3/2025    NICOTINE (NICODERM CQ) 7 MG/24 HR    Place 1 patch onto the skin once daily. for 14 days       Start Date: 4/3/2025  End Date: 4/17/2025    VARENICLINE TARTRATE (CHANTIX STARTING MONTH BOX) 0.5 MG (11)- 1 MG (42) TABLET    Take one 0.5mg tab by mouth once daily X3 days,then increase to one 0.5mg tab twice daily X4 days,then increase to one 1mg tab twice daily       Start Date: 2/20/2025 End Date: 5/20/2025            Objective:        Vitals:    03/06/25 1127   BP: 104/69   BP Location: Left arm   Patient Position: Sitting   Pulse: 88   Resp: 18   Temp: 98.3 °F (36.8 °C)   TempSrc: Oral   SpO2: 98%   Weight: 58.1 kg (128 lb)   Height: 5' 2" (1.575 m)       Physical Exam  Vitals and nursing note reviewed.   Constitutional:       General: She is not in acute distress.     Appearance: Normal appearance. She is not ill-appearing, toxic-appearing or diaphoretic.   HENT:      Head: Normocephalic and atraumatic.      Right Ear: External ear normal.      Left Ear: External ear normal.      Nose: Nose normal. No congestion.      Mouth/Throat:      Pharynx: No oropharyngeal exudate or posterior oropharyngeal erythema.   Eyes:      General: No scleral icterus.        Right eye: No discharge.         Left eye: No discharge.   Cardiovascular:      " Rate and Rhythm: Normal rate.      Pulses: Normal pulses.   Pulmonary:      Effort: Pulmonary effort is normal. No respiratory distress.   Abdominal:      General: There is no distension.      Palpations: Abdomen is soft.   Musculoskeletal:      Cervical back: Neck supple.      Right lower leg: No edema.   Skin:     General: Skin is warm and dry.      Coloration: Skin is not jaundiced or pale.      Findings: No lesion.   Neurological:      Mental Status: She is alert.      Gait: Gait normal.   Psychiatric:         Attention and Perception: Attention normal.         Mood and Affect: Mood normal.         Speech: Speech normal.         Behavior: Behavior normal. Behavior is not agitated. Behavior is cooperative.         Thought Content: Thought content does not include homicidal or suicidal ideation. Thought content does not include homicidal or suicidal plan.               Lab Results   Component Value Date    WBC 6.43 11/11/2024    HGB 14.7 11/11/2024    HCT 44.7 11/11/2024     11/11/2024    CHOL 177 12/19/2024    TRIG 69 12/19/2024    HDL 65 (H) 12/19/2024    ALT 26 12/19/2024    AST 21 12/19/2024     12/19/2024    K 4.3 12/19/2024     12/19/2024    CREATININE 0.69 12/19/2024    BUN 10 12/19/2024    CO2 25 12/19/2024    TSH 3.060 11/11/2024      Assessment:       1. JUDITH (generalized anxiety disorder)        Plan:       Assessment  - Generalized Anxiety Disorder  - Depression    Plan and treatment  - Start Zoloft 25 mg once daily for one month with a follow-up in two weeks.  - Referral to [facility] for Cognitive Behavioral Therapy.  - Monitor the effects of Zoloft and consider titration to 50 mg after one week if needed.  - Follow-up in one month to assess medication efficacy and adjust the treatment plan as necessary.    Problem List Items Addressed This Visit          Psychiatric    JUDITH (generalized anxiety disorder) - Primary    Relevant Medications    sertraline (ZOLOFT) 25 MG  tablet    Other Relevant Orders    Ambulatory referral/consult to Psychiatry         Follow up in about 2 weeks (around 3/20/2025) for JUDITH.    Arsenio Mtz DO     Instructed patient that if symptoms fail to improve or worsen patient should seek immediate medical attention or report to the nearest emergency department. Patient expressed verbal agreement and understanding to this plan of care.            [1]   Current Outpatient Medications:     hydrOXYzine pamoate (VISTARIL) 25 MG Cap, Take 1 capsule (25 mg total) by mouth every 6 (six) hours as needed (anxiety)., Disp: 45 capsule, Rfl: 0    nicotine (NICODERM CQ) 14 mg/24 hr, Place 1 patch onto the skin once daily. (Patient not taking: Reported on 3/6/2025), Disp: 30 patch, Rfl: 1    [START ON 4/3/2025] nicotine (NICODERM CQ) 7 mg/24 hr, Place 1 patch onto the skin once daily. for 14 days (Patient not taking: Reported on 3/6/2025), Disp: 14 patch, Rfl: 0    sertraline (ZOLOFT) 25 MG tablet, Take 1 tablet (25 mg total) by mouth once daily., Disp: 30 tablet, Rfl: 0    varenicline tartrate (CHANTIX STARTING MONTH BOX) 0.5 mg (11)- 1 mg (42) tablet, Take one 0.5mg tab by mouth once daily X3 days,then increase to one 0.5mg tab twice daily X4 days,then increase to one 1mg tab twice daily (Patient not taking: Reported on 3/6/2025), Disp: 1 each, Rfl: 0  [2]   Social History  Tobacco Use    Smoking status: Every Day     Types: Vaping with nicotine    Smokeless tobacco: Never   Substance Use Topics    Alcohol use: Not Currently    Drug use: Never

## 2025-03-10 ENCOUNTER — OFFICE VISIT (OUTPATIENT)
Facility: CLINIC | Age: 30
End: 2025-03-10
Payer: COMMERCIAL

## 2025-03-10 VITALS
HEART RATE: 94 BPM | BODY MASS INDEX: 23.37 KG/M2 | DIASTOLIC BLOOD PRESSURE: 77 MMHG | HEIGHT: 62 IN | RESPIRATION RATE: 17 BRPM | WEIGHT: 127 LBS | SYSTOLIC BLOOD PRESSURE: 117 MMHG | OXYGEN SATURATION: 99 %

## 2025-03-10 DIAGNOSIS — Z30.46 ENCOUNTER FOR NEXPLANON REMOVAL: Primary | ICD-10-CM

## 2025-03-10 PROCEDURE — 99499 UNLISTED E&M SERVICE: CPT | Mod: ,,, | Performed by: ADVANCED PRACTICE MIDWIFE

## 2025-03-10 PROCEDURE — 11982 REMOVE DRUG IMPLANT DEVICE: CPT | Mod: ,,, | Performed by: ADVANCED PRACTICE MIDWIFE

## 2025-03-10 NOTE — PROCEDURES
Removal of Nexplanon Device    Date/Time: 3/10/2025 11:30 AM    Performed by: Sofia Mcintyre CNM  Authorized by: Sofia Mcintyre CNM    Consent obtained:  Prior to procedure the appropriate consent was completed and verified  Consent given by:  Patient  Procedure risks and benefits discussed: yes    Patient questions answered: yes    Patient agrees, verbalizes understanding, and wants to proceed: yes    Educational handouts given: yes    Instructions and paperwork completed: yes    Implant grasped by: hemostat  Removal due to infection and inflammatory reaction: no    Removal due to mechanical complications: no    Removed with no complications: yes     Post procedure site care instructions given.   Removal due to expiration: yes    Arm: left arm  Palpation confirms location: yes  Small stab incision was made in arm: yes  Upon removal device was intact: yes  Site was close with steri-strips and pressure bandage applied: yes  Pre-procedure timeout performed: yes  Prepped with:  povidone-iodine 7.5% surgical scrub and alcohol 70%  Local anesthetic:  Lidocaine with epinephrine   The site was cleaned  and prepped in a sterile fashion: yes  Specimen sent to pathology: Yes